# Patient Record
Sex: MALE | Race: BLACK OR AFRICAN AMERICAN | NOT HISPANIC OR LATINO | URBAN - METROPOLITAN AREA
[De-identification: names, ages, dates, MRNs, and addresses within clinical notes are randomized per-mention and may not be internally consistent; named-entity substitution may affect disease eponyms.]

---

## 2024-09-03 ENCOUNTER — EMERGENCY (EMERGENCY)
Facility: HOSPITAL | Age: 59
LOS: 1 days | Discharge: HOME | End: 2024-09-03
Attending: EMERGENCY MEDICINE | Admitting: EMERGENCY MEDICINE
Payer: COMMERCIAL

## 2024-09-03 VITALS
OXYGEN SATURATION: 96 % | HEART RATE: 75 BPM | DIASTOLIC BLOOD PRESSURE: 94 MMHG | RESPIRATION RATE: 18 BRPM | SYSTOLIC BLOOD PRESSURE: 180 MMHG | TEMPERATURE: 98 F

## 2024-09-03 VITALS
HEIGHT: 69 IN | SYSTOLIC BLOOD PRESSURE: 184 MMHG | WEIGHT: 164.91 LBS | TEMPERATURE: 98 F | DIASTOLIC BLOOD PRESSURE: 98 MMHG | HEART RATE: 85 BPM | RESPIRATION RATE: 20 BRPM | OXYGEN SATURATION: 98 %

## 2024-09-03 DIAGNOSIS — R00.2 PALPITATIONS: ICD-10-CM

## 2024-09-03 DIAGNOSIS — E78.5 HYPERLIPIDEMIA, UNSPECIFIED: ICD-10-CM

## 2024-09-03 DIAGNOSIS — I10 ESSENTIAL (PRIMARY) HYPERTENSION: ICD-10-CM

## 2024-09-03 DIAGNOSIS — I48.0 PAROXYSMAL ATRIAL FIBRILLATION: ICD-10-CM

## 2024-09-03 DIAGNOSIS — Z79.01 LONG TERM (CURRENT) USE OF ANTICOAGULANTS: ICD-10-CM

## 2024-09-03 LAB
ALBUMIN SERPL ELPH-MCNC: 3.7 G/DL — SIGNIFICANT CHANGE UP (ref 3.4–5)
ALP SERPL-CCNC: 117 U/L — SIGNIFICANT CHANGE UP (ref 40–120)
ALT FLD-CCNC: 62 U/L — HIGH (ref 12–42)
ANION GAP SERPL CALC-SCNC: 20 MMOL/L — HIGH (ref 9–16)
ANION GAP SERPL CALC-SCNC: 22 MMOL/L — HIGH (ref 9–16)
AST SERPL-CCNC: <5 U/L — LOW (ref 15–37)
BASOPHILS # BLD AUTO: 0.03 K/UL — SIGNIFICANT CHANGE UP (ref 0–0.2)
BASOPHILS NFR BLD AUTO: 0.6 % — SIGNIFICANT CHANGE UP (ref 0–2)
BILIRUB SERPL-MCNC: 0.4 MG/DL — SIGNIFICANT CHANGE UP (ref 0.2–1.2)
BUN SERPL-MCNC: 17 MG/DL — SIGNIFICANT CHANGE UP (ref 7–23)
BUN SERPL-MCNC: 18 MG/DL — SIGNIFICANT CHANGE UP (ref 7–23)
CALCIUM SERPL-MCNC: 8.6 MG/DL — SIGNIFICANT CHANGE UP (ref 8.5–10.5)
CALCIUM SERPL-MCNC: 8.7 MG/DL — SIGNIFICANT CHANGE UP (ref 8.5–10.5)
CHLORIDE SERPL-SCNC: 100 MMOL/L — SIGNIFICANT CHANGE UP (ref 96–108)
CHLORIDE SERPL-SCNC: 101 MMOL/L — SIGNIFICANT CHANGE UP (ref 96–108)
CO2 SERPL-SCNC: 16 MMOL/L — LOW (ref 22–31)
CO2 SERPL-SCNC: 19 MMOL/L — LOW (ref 22–31)
CREAT SERPL-MCNC: 1.14 MG/DL — SIGNIFICANT CHANGE UP (ref 0.5–1.3)
CREAT SERPL-MCNC: 1.32 MG/DL — HIGH (ref 0.5–1.3)
D DIMER BLD IA.RAPID-MCNC: <187 NG/ML DDU — SIGNIFICANT CHANGE UP
EGFR: 62 ML/MIN/1.73M2 — SIGNIFICANT CHANGE UP
EGFR: 74 ML/MIN/1.73M2 — SIGNIFICANT CHANGE UP
EOSINOPHIL # BLD AUTO: 0.01 K/UL — SIGNIFICANT CHANGE UP (ref 0–0.5)
EOSINOPHIL NFR BLD AUTO: 0.2 % — SIGNIFICANT CHANGE UP (ref 0–6)
GLUCOSE BLDC GLUCOMTR-MCNC: 86 MG/DL — SIGNIFICANT CHANGE UP (ref 70–99)
GLUCOSE SERPL-MCNC: 102 MG/DL — HIGH (ref 70–99)
GLUCOSE SERPL-MCNC: 84 MG/DL — SIGNIFICANT CHANGE UP (ref 70–99)
HCT VFR BLD CALC: 37.1 % — LOW (ref 39–50)
HGB BLD-MCNC: 12.4 G/DL — LOW (ref 13–17)
IMM GRANULOCYTES NFR BLD AUTO: 0.6 % — SIGNIFICANT CHANGE UP (ref 0–0.9)
LYMPHOCYTES # BLD AUTO: 1.54 K/UL — SIGNIFICANT CHANGE UP (ref 1–3.3)
LYMPHOCYTES # BLD AUTO: 32 % — SIGNIFICANT CHANGE UP (ref 13–44)
MAGNESIUM SERPL-MCNC: 1.3 MG/DL — LOW (ref 1.6–2.6)
MCHC RBC-ENTMCNC: 33.2 PG — SIGNIFICANT CHANGE UP (ref 27–34)
MCHC RBC-ENTMCNC: 33.4 GM/DL — SIGNIFICANT CHANGE UP (ref 32–36)
MCV RBC AUTO: 99.2 FL — SIGNIFICANT CHANGE UP (ref 80–100)
MONOCYTES # BLD AUTO: 0.41 K/UL — SIGNIFICANT CHANGE UP (ref 0–0.9)
MONOCYTES NFR BLD AUTO: 8.5 % — SIGNIFICANT CHANGE UP (ref 2–14)
NEUTROPHILS # BLD AUTO: 2.79 K/UL — SIGNIFICANT CHANGE UP (ref 1.8–7.4)
NEUTROPHILS NFR BLD AUTO: 58.1 % — SIGNIFICANT CHANGE UP (ref 43–77)
NRBC # BLD: 0 /100 WBCS — SIGNIFICANT CHANGE UP (ref 0–0)
PLATELET # BLD AUTO: 217 K/UL — SIGNIFICANT CHANGE UP (ref 150–400)
POTASSIUM SERPL-MCNC: 4.1 MMOL/L — SIGNIFICANT CHANGE UP (ref 3.5–5.3)
POTASSIUM SERPL-MCNC: 4.3 MMOL/L — SIGNIFICANT CHANGE UP (ref 3.5–5.3)
POTASSIUM SERPL-SCNC: 4.1 MMOL/L — SIGNIFICANT CHANGE UP (ref 3.5–5.3)
POTASSIUM SERPL-SCNC: 4.3 MMOL/L — SIGNIFICANT CHANGE UP (ref 3.5–5.3)
PROT SERPL-MCNC: 8.4 G/DL — HIGH (ref 6.4–8.2)
RBC # BLD: 3.74 M/UL — LOW (ref 4.2–5.8)
RBC # FLD: 12.4 % — SIGNIFICANT CHANGE UP (ref 10.3–14.5)
SODIUM SERPL-SCNC: 138 MMOL/L — SIGNIFICANT CHANGE UP (ref 132–145)
SODIUM SERPL-SCNC: 140 MMOL/L — SIGNIFICANT CHANGE UP (ref 132–145)
TROPONIN I, HIGH SENSITIVITY RESULT: 29.3 NG/L — SIGNIFICANT CHANGE UP
TROPONIN I, HIGH SENSITIVITY RESULT: 35.6 NG/L — SIGNIFICANT CHANGE UP
TSH SERPL-MCNC: 0.57 UIU/ML — SIGNIFICANT CHANGE UP (ref 0.36–3.74)
WBC # BLD: 4.81 K/UL — SIGNIFICANT CHANGE UP (ref 3.8–10.5)
WBC # FLD AUTO: 4.81 K/UL — SIGNIFICANT CHANGE UP (ref 3.8–10.5)

## 2024-09-03 PROCEDURE — 70498 CT ANGIOGRAPHY NECK: CPT | Mod: 26,MC

## 2024-09-03 PROCEDURE — 71045 X-RAY EXAM CHEST 1 VIEW: CPT | Mod: 26

## 2024-09-03 PROCEDURE — 0042T: CPT | Mod: MC

## 2024-09-03 PROCEDURE — 70496 CT ANGIOGRAPHY HEAD: CPT | Mod: 26,MC

## 2024-09-03 PROCEDURE — 99285 EMERGENCY DEPT VISIT HI MDM: CPT

## 2024-09-03 RX ORDER — ONDANSETRON 2 MG/ML
4 INJECTION, SOLUTION INTRAMUSCULAR; INTRAVENOUS ONCE
Refills: 0 | Status: COMPLETED | OUTPATIENT
Start: 2024-09-03 | End: 2024-09-03

## 2024-09-03 RX ORDER — LISINOPRIL 10 MG/1
20 TABLET ORAL ONCE
Refills: 0 | Status: DISCONTINUED | OUTPATIENT
Start: 2024-09-03 | End: 2024-09-03

## 2024-09-03 RX ORDER — AMLODIPINE BESYLATE 10 MG/1
5 TABLET ORAL ONCE
Refills: 0 | Status: DISCONTINUED | OUTPATIENT
Start: 2024-09-03 | End: 2024-09-03

## 2024-09-03 RX ORDER — APIXABAN 5 MG/1
1 TABLET, FILM COATED ORAL
Qty: 60 | Refills: 0
Start: 2024-09-03 | End: 2024-10-02

## 2024-09-03 RX ADMIN — ONDANSETRON 4 MILLIGRAM(S): 2 INJECTION, SOLUTION INTRAMUSCULAR; INTRAVENOUS at 14:28

## 2024-09-03 RX ADMIN — Medication 100 GRAM(S): at 10:19

## 2024-09-03 NOTE — ED ADULT NURSE NOTE - CHIEF COMPLAINT QUOTE
Pt brought in by EMS with complaint of panic attack stating he palpitations, anxiety and numbness and tingling to his hands. Reports last time this happened, he went to [place] and they discovered he had afib. Denies chest pain.

## 2024-09-03 NOTE — ED PROVIDER NOTE - OBJECTIVE STATEMENT
59 yo male with paroxysmal a fib on xeralto, htn on amlodipine/benazepril, metoprolol, hld, presents c/o feeling like his heart was racing that started while at work this 8am. denies cp/sob/syncope/fever or chills/abd pain/neck pain/back pain. currently feeling tingling to bilateral 3/4/5th fingertips. arrives hypertensive. states he has been compliant with his medications. 57 yo male with paroxysmal a fib on xeralto, htn on amlodipine/benazepril, metoprolol, hld, presents c/o feeling like his heart was racing that started while at work this 8am. also reports he has difficulty with word finding since this morning, currently asking "why am I having such difficult time finding words?". denies cp/sob/syncope/fever or chills/abd pain/neck pain/back pain. currently feeling tingling to bilateral 3/4/5th fingertips. arrives hypertensive. states he has been compliant with his medications. reports one episode of similar symptoms previously where he went to [facility] and he was told he was in afib. has cardiologist from the [facility] system that he follows with

## 2024-09-03 NOTE — ED PROVIDER NOTE - PROGRESS NOTE DETAILS
CT brain neg, CTA brain/neck no acute findings. mag 1.3, supplemented, labs unremarkable. patient feeling better, symptoms resolved. NIHSS 0. EKG NSR at 83, twi inferiorlateral, J point elevation V2, V3, LVH. Reviewed previous EKG on patient's phone portal, similar to current EKG although poor quality/lots of artifact. discussed with Dr. Macedo attending telestroke neurologist, he advised switching patient's home meds to eliquis (from [facility]), follow up outpatient. repeat 2nd ekg NSR at 81, twi globally except I, avL, V3, no ST elevation or depression, 3rd EKG similar to 1st EKG, discussed with cardiologist attending on call Dr. Church, reviewed EKGs, no further intervention at this time, patient to follow up with his cardiologist outpatient. patient looks well, he is feeling better, tolerating po. he would like to be discharged, dc home with family, they agree with plan.

## 2024-09-03 NOTE — ED PROVIDER NOTE - PATIENT PORTAL LINK FT
You can access the FollowMyHealth Patient Portal offered by [facility] by registering at the following website: [email or web address]. By joining [employer]’s FollowMyHealth portal, you will also be able to view your health information using other applications (apps) compatible with our system.

## 2024-09-03 NOTE — ED PROVIDER NOTE - ATTENDING APP SHARED VISIT CONTRIBUTION OF CARE
59yo M hx of paroxysmal AFib on Xarelto, HTN, HLD, presents with sensation of heart racing and difficulty with word finding as well as tingling in b/l fingertips.  On exam, afebrile, hypertensive, no localizing neuro deficits.  EKG NSR w inferolateral TWI.  Stroke code called on arrival to ED.  Imaging unremarkable.  Trop negative x2, repeat EKG unchanged.  Pt feels well in ED, wants to go home.  Case discussed w Dr. Macedo from stroke neuro and Dr. peters from cardiology.  OK to Dc home with plan for close outpatient follow up.  Stable for dc.

## 2024-09-03 NOTE — ED PROVIDER NOTE - NSFOLLOWUPINSTRUCTIONS_ED_ALL_ED_FT
Please take eliquis instead of xeralto  Please follow up with neurologist Dr. Macedo, please call office and make an appointment.   Continue taking the rest of your medications.  Please follow up with your cardiologist  Your magnesium was low, it was supplemented in the Emergency Room    Please return to the Emergency Room for worsening or new symptoms such as slurred speech, weakness, chest pain, shortness of breath, or any concerns.

## 2024-09-03 NOTE — ED ADULT NURSE NOTE - OBJECTIVE STATEMENT
Patient is a 57 y/o M c/o panic attack, patient reports panic attack and heart palpitations that began at 630 am. patient reports recent diagnosis of a fib in December 2023. patient is on xarelto, and lisinopril. patient has hx of htn. patient has difficulty catching words. patient reports mild headache. Frances Foley made aware.

## 2024-09-03 NOTE — ED ADULT TRIAGE NOTE - CHIEF COMPLAINT QUOTE
Psych/Behavioral Pt brought in by EMS with complaint of panic attack stating he palpitations, anxiety and numbness and tingling to his hands. Reports last time this happened, he went to [place] and they discovered he had afib. Denies chest pain.

## 2024-09-03 NOTE — ED PROVIDER NOTE - CLINICAL SUMMARY MEDICAL DECISION MAKING FREE TEXT BOX
59 yo male with paroxysmal a fib on xeralto, htn on amlodipine/benazepril, metoprolol, hld, presents c/o feeling like his heart was racing that started while at work this 8am. also reports he has difficulty with word finding since this morning, currently asking "why am I having such difficult time finding words?". denies cp/sob/syncope/fever or chills/abd pain/neck pain/back pain. currently feeling tingling to bilateral 3/4/5th fingertips. arrives hypertensive. states he has been compliant with his medications. reports one episode of similar symptoms previously where he went to [facility] and he was told he was in afib. has cardiologist from the [facility] system that he follows with    patient arrives hypertensive, no focal deficits on exam, NIHSS 0, Stroke code called, labs/imaging/ekg ordered.

## 2024-09-03 NOTE — ED ADULT NURSE NOTE - NSFALLUNIVINTERV_ED_ALL_ED
Bed/Stretcher in lowest position, wheels locked, appropriate side rails in place/Call bell, personal items and telephone in reach/Instruct patient to call for assistance before getting out of bed/chair/stretcher/Non-slip footwear applied when patient is off stretcher/[place] to call system/Physically safe environment - no spills, clutter or unnecessary equipment/Purposeful proactive rounding/Room/bathroom lighting operational, light cord in reach

## 2024-09-03 NOTE — ED PROVIDER NOTE - CARE PROVIDER_API CALL
Gwen Macedo  Neurology  [address] [address]  Phone: [phone]  Fax: [phone]  Follow Up Time:

## 2024-09-09 ENCOUNTER — NON-APPOINTMENT (OUTPATIENT)
Age: 59
End: 2024-09-09

## 2024-09-09 PROBLEM — Z00.00 ENCOUNTER FOR PREVENTIVE HEALTH EXAMINATION: Status: ACTIVE | Noted: 2024-09-09

## 2025-03-03 ENCOUNTER — INPATIENT (INPATIENT)
Facility: HOSPITAL | Age: 60
LOS: 0 days | Discharge: HOME | DRG: 897 | End: 2025-03-04
Attending: PSYCHIATRY & NEUROLOGY | Admitting: PSYCHIATRY & NEUROLOGY
Payer: COMMERCIAL

## 2025-03-03 VITALS
SYSTOLIC BLOOD PRESSURE: 158 MMHG | HEART RATE: 87 BPM | DIASTOLIC BLOOD PRESSURE: 83 MMHG | TEMPERATURE: 99 F | WEIGHT: 160.06 LBS | RESPIRATION RATE: 16 BRPM | OXYGEN SATURATION: 100 %

## 2025-03-03 DIAGNOSIS — E87.29 OTHER ACIDOSIS: ICD-10-CM

## 2025-03-03 DIAGNOSIS — C61 MALIGNANT NEOPLASM OF PROSTATE: ICD-10-CM

## 2025-03-03 DIAGNOSIS — I48.20 CHRONIC ATRIAL FIBRILLATION, UNSPECIFIED: ICD-10-CM

## 2025-03-03 DIAGNOSIS — I10 ESSENTIAL (PRIMARY) HYPERTENSION: ICD-10-CM

## 2025-03-03 DIAGNOSIS — F10.20 ALCOHOL DEPENDENCE, UNCOMPLICATED: ICD-10-CM

## 2025-03-03 DIAGNOSIS — D72.819 DECREASED WHITE BLOOD CELL COUNT, UNSPECIFIED: ICD-10-CM

## 2025-03-03 LAB
ALBUMIN SERPL ELPH-MCNC: 4 G/DL — SIGNIFICANT CHANGE UP (ref 3.4–5)
ALP SERPL-CCNC: 128 U/L — HIGH (ref 40–120)
ALT FLD-CCNC: 20 U/L — SIGNIFICANT CHANGE UP (ref 12–42)
ANION GAP SERPL CALC-SCNC: 17 MMOL/L — HIGH (ref 9–16)
APPEARANCE UR: CLEAR — SIGNIFICANT CHANGE UP
APTT BLD: 34 SEC — SIGNIFICANT CHANGE UP (ref 24.5–35.6)
AST SERPL-CCNC: 22 U/L — SIGNIFICANT CHANGE UP (ref 15–37)
BASOPHILS # BLD AUTO: 0.03 K/UL — SIGNIFICANT CHANGE UP (ref 0–0.2)
BASOPHILS NFR BLD AUTO: 1 % — SIGNIFICANT CHANGE UP (ref 0–2)
BILIRUB SERPL-MCNC: 0.5 MG/DL — SIGNIFICANT CHANGE UP (ref 0.2–1.2)
BILIRUB UR-MCNC: NEGATIVE — SIGNIFICANT CHANGE UP
BUN SERPL-MCNC: 22 MG/DL — SIGNIFICANT CHANGE UP (ref 7–23)
CALCIUM SERPL-MCNC: 9.8 MG/DL — SIGNIFICANT CHANGE UP (ref 8.5–10.5)
CHLORIDE SERPL-SCNC: 104 MMOL/L — SIGNIFICANT CHANGE UP (ref 96–108)
CO2 SERPL-SCNC: 21 MMOL/L — LOW (ref 22–31)
COLOR SPEC: YELLOW — SIGNIFICANT CHANGE UP
CREAT SERPL-MCNC: 1.06 MG/DL — SIGNIFICANT CHANGE UP (ref 0.5–1.3)
DIFF PNL FLD: ABNORMAL
EGFR: 80 ML/MIN/1.73M2 — SIGNIFICANT CHANGE UP
EGFR: 80 ML/MIN/1.73M2 — SIGNIFICANT CHANGE UP
EOSINOPHIL # BLD AUTO: 0 K/UL — SIGNIFICANT CHANGE UP (ref 0–0.5)
EOSINOPHIL NFR BLD AUTO: 0 % — SIGNIFICANT CHANGE UP (ref 0–6)
ETHANOL SERPL-MCNC: 133 MG/DL — HIGH
GLUCOSE SERPL-MCNC: 109 MG/DL — HIGH (ref 70–99)
GLUCOSE UR QL: NEGATIVE MG/DL — SIGNIFICANT CHANGE UP
HCT VFR BLD CALC: 34.2 % — LOW (ref 39–50)
HGB BLD-MCNC: 11.8 G/DL — LOW (ref 13–17)
IMM GRANULOCYTES # BLD AUTO: 0.03 K/UL — SIGNIFICANT CHANGE UP (ref 0–0.07)
IMM GRANULOCYTES NFR BLD AUTO: 1 % — HIGH (ref 0–0.9)
INR BLD: 0.94 — SIGNIFICANT CHANGE UP (ref 0.85–1.16)
KETONES UR-MCNC: ABNORMAL MG/DL
LEUKOCYTE ESTERASE UR-ACNC: NEGATIVE — SIGNIFICANT CHANGE UP
LYMPHOCYTES # BLD AUTO: 0.59 K/UL — LOW (ref 1–3.3)
LYMPHOCYTES NFR BLD AUTO: 18.9 % — SIGNIFICANT CHANGE UP (ref 13–44)
MAGNESIUM SERPL-MCNC: 1.9 MG/DL — SIGNIFICANT CHANGE UP (ref 1.6–2.6)
MCHC RBC-ENTMCNC: 32.8 PG — SIGNIFICANT CHANGE UP (ref 27–34)
MCHC RBC-ENTMCNC: 34.5 G/DL — SIGNIFICANT CHANGE UP (ref 32–36)
MCV RBC AUTO: 95 FL — SIGNIFICANT CHANGE UP (ref 80–100)
MONOCYTES # BLD AUTO: 0.24 K/UL — SIGNIFICANT CHANGE UP (ref 0–0.9)
MONOCYTES NFR BLD AUTO: 7.7 % — SIGNIFICANT CHANGE UP (ref 2–14)
NEUTROPHILS # BLD AUTO: 2.23 K/UL — SIGNIFICANT CHANGE UP (ref 1.8–7.4)
NEUTROPHILS NFR BLD AUTO: 71.4 % — SIGNIFICANT CHANGE UP (ref 43–77)
NITRITE UR-MCNC: NEGATIVE — SIGNIFICANT CHANGE UP
NRBC # BLD AUTO: 0 K/UL — SIGNIFICANT CHANGE UP (ref 0–0)
NRBC # FLD: 0 K/UL — SIGNIFICANT CHANGE UP (ref 0–0)
NRBC BLD AUTO-RTO: 0 /100 WBCS — SIGNIFICANT CHANGE UP (ref 0–0)
PH UR: 5.5 — SIGNIFICANT CHANGE UP (ref 5–8)
PHOSPHATE SERPL-MCNC: 3.3 MG/DL — SIGNIFICANT CHANGE UP (ref 2.5–4.5)
PLATELET # BLD AUTO: 216 K/UL — SIGNIFICANT CHANGE UP (ref 150–400)
PMV BLD: 8.7 FL — SIGNIFICANT CHANGE UP (ref 7–13)
POTASSIUM SERPL-MCNC: 4.5 MMOL/L — SIGNIFICANT CHANGE UP (ref 3.5–5.3)
POTASSIUM SERPL-SCNC: 4.5 MMOL/L — SIGNIFICANT CHANGE UP (ref 3.5–5.3)
PROT SERPL-MCNC: 8.8 G/DL — HIGH (ref 6.4–8.2)
PROT UR-MCNC: 30 MG/DL
PROTHROM AB SERPL-ACNC: 10.9 SEC — SIGNIFICANT CHANGE UP (ref 9.9–13.4)
RBC # BLD: 3.6 M/UL — LOW (ref 4.2–5.8)
RBC # FLD: 11.9 % — SIGNIFICANT CHANGE UP (ref 10.3–14.5)
SODIUM SERPL-SCNC: 142 MMOL/L — SIGNIFICANT CHANGE UP (ref 132–145)
SP GR SPEC: 1.01 — SIGNIFICANT CHANGE UP (ref 1–1.03)
TROPONIN I, HIGH SENSITIVITY RESULT: 17 NG/L — SIGNIFICANT CHANGE UP
UROBILINOGEN FLD QL: 0.2 MG/DL — SIGNIFICANT CHANGE UP (ref 0.2–1)
WBC # BLD: 3.12 K/UL — LOW (ref 3.8–10.5)
WBC # FLD AUTO: 3.12 K/UL — LOW (ref 3.8–10.5)

## 2025-03-03 PROCEDURE — 70496 CT ANGIOGRAPHY HEAD: CPT | Mod: 26

## 2025-03-03 PROCEDURE — 99223 1ST HOSP IP/OBS HIGH 75: CPT

## 2025-03-03 PROCEDURE — 99291 CRITICAL CARE FIRST HOUR: CPT

## 2025-03-03 PROCEDURE — 70498 CT ANGIOGRAPHY NECK: CPT | Mod: 26

## 2025-03-03 PROCEDURE — 70551 MRI BRAIN STEM W/O DYE: CPT | Mod: 26

## 2025-03-03 PROCEDURE — 70450 CT HEAD/BRAIN W/O DYE: CPT | Mod: 26,59

## 2025-03-03 PROCEDURE — 0042T: CPT

## 2025-03-03 RX ORDER — ABIRATERONE ACETATE 500 MG/1
4 TABLET, FILM COATED ORAL
Refills: 0 | DISCHARGE

## 2025-03-03 RX ORDER — AMLODIPINE BESYLATE 10 MG/1
1 TABLET ORAL
Refills: 0 | DISCHARGE

## 2025-03-03 RX ORDER — PREDNISONE 20 MG/1
1 TABLET ORAL
Refills: 0 | DISCHARGE

## 2025-03-03 RX ORDER — RIVAROXABAN 10 MG/1
1 TABLET, FILM COATED ORAL
Refills: 0 | DISCHARGE

## 2025-03-03 RX ORDER — TAMSULOSIN HYDROCHLORIDE 0.4 MG/1
0.4 CAPSULE ORAL DAILY
Refills: 0 | Status: DISCONTINUED | OUTPATIENT
Start: 2025-03-04 | End: 2025-03-04

## 2025-03-03 RX ORDER — RIVAROXABAN 10 MG/1
20 TABLET, FILM COATED ORAL
Refills: 0 | Status: DISCONTINUED | OUTPATIENT
Start: 2025-03-03 | End: 2025-03-04

## 2025-03-03 RX ORDER — METOPROLOL SUCCINATE 50 MG/1
50 TABLET, EXTENDED RELEASE ORAL DAILY
Refills: 0 | Status: DISCONTINUED | OUTPATIENT
Start: 2025-03-03 | End: 2025-03-03

## 2025-03-03 RX ORDER — METOPROLOL SUCCINATE 50 MG/1
1 TABLET, EXTENDED RELEASE ORAL
Refills: 0 | DISCHARGE

## 2025-03-03 RX ORDER — TAMSULOSIN HYDROCHLORIDE 0.4 MG/1
1 CAPSULE ORAL
Refills: 0 | DISCHARGE

## 2025-03-03 RX ORDER — METOPROLOL SUCCINATE 50 MG/1
50 TABLET, EXTENDED RELEASE ORAL DAILY
Refills: 0 | Status: DISCONTINUED | OUTPATIENT
Start: 2025-03-03 | End: 2025-03-04

## 2025-03-03 RX ADMIN — METOPROLOL SUCCINATE 50 MILLIGRAM(S): 50 TABLET, EXTENDED RELEASE ORAL at 16:34

## 2025-03-03 RX ADMIN — RIVAROXABAN 20 MILLIGRAM(S): 10 TABLET, FILM COATED ORAL at 16:34

## 2025-03-03 RX ADMIN — Medication 100 MILLIGRAM(S): at 12:16

## 2025-03-03 RX ADMIN — Medication 75 MILLILITER(S): at 12:16

## 2025-03-03 NOTE — ED PROVIDER NOTE - ATTENDING CONTRIBUTION TO CARE
Patient brought in by ambulance for difficulty speaking. Had unsteady gait on scene per EMS. Patient reports right face, arm, and leg numbness. Patient reported his last known well was 6:30 AM, but coworker who came with patient said that patient is a first wanted to work every morning, and another coworker who arrived at 620 saw that the patient already looked unwell at that time.  I spoke to patient's wife on the phone who gave last known well at 4 AM.  Patient took his Eliquis this morning.  Triage nurse wrote that patient started on Eliquis 1 week ago.  Wife reports it was 1 year ago.  Patient finished radiation for prostate cancer in January and is now taking abiraterone.  No headache, chest pain, shortness of breath, abdominal pain, focal weakness.    Gen: NAD. HEENT: NCAT, mmm   Chest: RRR, nl S1 and S2, no m/r/g. Resp: CTAB, no w/r/r  Abd: nl BS, soft, nt/nd. Ext: Warm, dry  Neuro: CN II-XII intact, normal and equal strength and reflexes bilaterally, hypesthesia to right face, arm, and leg. stuttering speech, difficulty with word finding.  Psych: AAOx3

## 2025-03-03 NOTE — ED PROVIDER NOTE - NIH STROKE SCALE: 10. DYSARTHRIA, QM
[NL] : warm [FreeTextEntry1] : WELL-HYDRATED [FreeTextEntry2] : AFOF [FreeTextEntry9] : SMALL PROTRUSION OF  UMBILICUS HERNIA, HEALING SURGICAL SCARS (1 ON EACH ARIEL-ABDOMEN, ~2MM) (1) Mild-to-moderate dysarthria; patient slurs at least some words and, at worst, can be understood with some difficulty

## 2025-03-03 NOTE — H&P ADULT - NSHPPHYSICALEXAM_GEN_ALL_CORE
Physical exam:  General: No acute distress, awake and alert  Cardiovascular: Regular rate and rhythm  Pulmonary: No use of accessory muscles  GI: Abdomen soft, non-tender, non-distended    Neurologic:  -Mental status: Awake, alert, oriented to person, place, and time. Speech is fluent with intact naming, repetition, and comprehension, no dysarthria. Recent and remote memory intact. Follows commands. Attention/concentration intact. Fund of knowledge appropriate.  -Cranial nerves:   II: Visual fields are full to confrontation.  III, IV, VI: Extraocular movements are intact without nystagmus. Pupils equally round and reactive to light  V:  Facial sensation V1-V3 equal and intact   VII: Face is symmetric with normal eye closure and smile  VIII: Hearing is bilaterally intact to finger rub  IX, X: Uvula is midline and soft palate rises symmetrically  XI: Head turning and shoulder shrug are intact.  XII: Tongue protrudes midline  Motor: Normal bulk and tone. No pronator drift. Strength bilateral upper extremity 5/5, bilateral lower extremities 5/5.  Rapid alternating movements intact and symmetric  Sensation: Intact to light touch bilaterally. No neglect or extinction on double simultaneous testing.  Coordination: No dysmetria on finger-to-nose and heel-to-shin bilaterally  Reflexes: Downgoing toes bilaterally   Gait: Narrow gait and steady    NIHSS: 0  ASPECT Score: *** ICH Score: *** (GCS) Physical exam:  General: No acute distress, awake and alert  Cardiovascular: Regular rate and rhythm  Pulmonary: No use of accessory muscles  GI: Abdomen soft, non-tender, non-distended    Neurologic:  -Mental status: Awake, alert, oriented to person, place, and time. Speech is fluent with intact naming, repetition, and comprehension, no dysarthria. Recent and remote memory intact. Follows commands. Attention/concentration intact. Fund of knowledge appropriate. Patient is still intoxicated during exam but attentive.   -Cranial nerves:   II: Visual fields are full to confrontation, some blurry vision on right side (cataracts).   III, IV, VI: Extraocular movements are intact without nystagmus. Pupils equally round and reactive to light  V:  Facial sensation V1-V3 equal and intact   VII: Face is symmetric with normal eye closure and smile  VIII: Hearing is bilaterally intact to finger rub  XII: Tongue protrudes midline  Motor: Normal bulk and tone. No pronator drift. Strength bilateral upper extremity 4+/5, bilateral lower extremities 4+/5.  Sensation: Intact to light touch bilaterally. No neglect or extinction on double simultaneous testing.  Coordination: No dysmetria on finger-to-nose and heel-to-shin bilaterally  Gait: Patient can stand, however unable to walk due to intoxication, he states that the "room is swaying still"    NIHSS: 0  ASPECT Score: *** ICH Score: *** (GCS)

## 2025-03-03 NOTE — CONSULT NOTE ADULT - TIME BILLING
preparing to see Pt.; interviewing Pt.; examining Pt.; reviewing labs and images; documentation in [place]; d/c planning on IDRs; d/w Neuro ACP on rounds

## 2025-03-03 NOTE — PATIENT PROFILE ADULT - FALL HARM RISK - HARM RISK INTERVENTIONS
Assistance with ambulation/Assistance OOB with selected safe patient handling equipment/Communicate Risk of Fall with Harm to all staff/Discuss with provider need for PT consult/Monitor gait and stability/Reinforce activity limits and safety measures with patient and family/Tailored Fall Risk Interventions/Visual Cue: Yellow wristband and red socks/Bed in lowest position, wheels locked, appropriate side rails in place/Call bell, personal items and telephone in reach/Instruct patient to call for assistance before getting out of bed or chair/Non-slip footwear when patient is out of bed/[place] to call system/Physically safe environment - no spills, clutter or unnecessary equipment/Purposeful Proactive Rounding/Room/bathroom lighting operational, light cord in reach

## 2025-03-03 NOTE — CONSULT NOTE ADULT - PROBLEM SELECTOR RECOMMENDATION 2
daily drinker, (+) BAL  -- serial CIWAs, monitor for withdrawal  -- IV thiamine, folate, MVI  --  THEODORE gutierrez

## 2025-03-03 NOTE — H&P ADULT - ASSESSMENT
59-year-old male with PMHx  of atrial fibrillation compliant on Xarelto, hypertension, prostate cancer treated most recently with radiation in January presented to [facility] ER due to acute onset speech deficits, gait instability, right-sided sensory loss beginning sometime between 4 AM and 6:15 AM  3/3.  Patient was transferred to [facility] for stroke workup. Upon arrival , alcohol blood level was 133, pt was intoxicated. Per the wife, she saw  him leave for work without symptoms at 4 AM.  He was alone at his job until 615 when a coworker found him to be abnormal, stuttering his words and unable to lift his right side/ Wife states that this type of event has happened  twice before on two separate ER vists, after the patient takes his morning meds ( right side weakness, slurring of words). Wife also states that pt is a hevay drinker, and drinks mostly vodka every single day.  On assessment, pt is stutteirng words and able to follow some commands while intoxicated. Strengths upper and lower extremities is 5/5, unable to perform remainder of exam due to intoxication. CTH negative. Fluids and Thiamine ordered, and pt was put on CIWA precautions. Plan to reassess once pt has returned to baseline, admitted to stroke tele for further work up.     Neuro  #CVA workup  -  daily  - q4hr stroke neuro checks and vitals  - obtain MRI Brain without contrast  - Stroke Code HCT Results:   - Stroke Code CTA Results:  - Stroke education    Cards  #HTN  - Goal -180  - hold home blood pressure medication for now  - obtain TTE with bubble  - Stroke Code EKG Results:    #HLD  - high dose statin as above in CVA  - LDL results: pending    Pulm  - call provider if SPO2 < 94%    GI  #Nutrition/Fluids/Electrolytes   - replete K<4 and Mg <2  - Diet:  - IVF:     Renal  -     Infectious Disease  - Stroke Code CXR results:     Endocrine  #DM  - A1C results: pending  - ISS    - TSH results: pending    DVT Prophylaxis  - lovenox sq for DVT prophylaxis   - SCDs for DVT prophylaxis     Pt discussed during rounds with  *****    IDR Goals: Goals reviewed at interdisciplinary rounds with case management, social work, physical therapy, occupational therapy, and speech language pathology.   Please see specific therapy  notes for in depth goals.  Dispo: **********(Acute rehab - can tolerate 3 hours of therapy)     Discussed daily hospital plans and goals with patient and family at bedside. (Called and updated family.) 59-year-old male with PMHx  of atrial fibrillation compliant on Xarelto, hypertension, prostate cancer treated most recently with radiation in January presented to [facility] ER due to acute onset speech deficits, gait instability, right-sided sensory loss beginning sometime between 4 AM and 6:15 AM  3/3.  Patient was transferred to [facility] for stroke workup. Upon arrival , alcohol blood level was 133, pt was intoxicated. Per the wife, she saw  him leave for work without symptoms at 4 AM.  He was alone at his job until 615 when a coworker found him to be abnormal, stuttering his words and unable to lift his right side/ Wife states that this type of event has happened  twice before on two separate ER vists, after the patient takes his morning meds ( right side weakness, slurring of words). Wife also states that pt is a hevay drinker, and drinks mostly vodka every single day.  On assessment, pt is stutteirng words and able to follow some commands while intoxicated. Strengths upper and lower extremities is 5/5, unable to perform remainder of exam due to intoxication. CTH negative. Fluids and Thiamine ordered, and pt was put on CIWA precautions. Plan to reassess once pt has returned to baseline, admitted to stroke tele for further work up.     Neuro  #CVA workup   -  daily  - q4hr stroke neuro checks and vitals  - obtain MRI Brain without contrast  - Stroke Code HCT Results:   - Stroke Code CTA Results:  - Stroke education    Cards  #HTN #afib   - Goal -180  - hold home blood pressure medication for now  - obtain TTE with bubble  - Stroke Code EKG Results:    #HLD  - high dose statin as above in CVA  - LDL results: pending    Pulm  - call provider if SPO2 < 94%    GI  #Nutrition/Fluids/Electrolytes   - replete K<4 and Mg <2  - Diet:  - IVF:     Renal  -     Infectious Disease  - Stroke Code CXR results:     Endocrine  #DM  - A1C results: pending  - ISS    - TSH results: pending    DVT Prophylaxis  - lovenox sq for DVT prophylaxis   - SCDs for DVT prophylaxis     Pt discussed during rounds with  *****    IDR Goals: Goals reviewed at interdisciplinary rounds with case management, social work, physical therapy, occupational therapy, and speech language pathology.   Please see specific therapy  notes for in depth goals.  Dispo: **********(Acute rehab - can tolerate 3 hours of therapy)     Discussed daily hospital plans and goals with patient and family at bedside. (Called and updated family.) 59-year-old male with PMHx  of atrial fibrillation compliant on Xarelto, hypertension, prostate cancer treated most recently with radiation in January presented to [facility] ER due to acute onset speech deficits, gait instability, right-sided sensory loss beginning sometime between 4 AM and 6:15 AM  3/3.  Patient was transferred to [facility] for stroke workup. Upon arrival , alcohol blood level was 133, pt was intoxicated. Per the wife, she saw  him leave for work without symptoms at 4 AM.  He was alone at his job until 615 when a coworker found him to be abnormal, stuttering his words and unable to lift his right side/ Wife states that this type of event has happened  twice before on two separate ER vists, after the patient takes his morning meds ( right side weakness, slurring of words). Wife also states that pt is a hevay drinker, and drinks mostly vodka every single day.  On assessment, pt is stutteirng words and able to follow some commands while intoxicated. Strengths upper and lower extremities is 5/5, unable to perform remainder of exam due to intoxication. CTH negative. Fluids and Thiamine ordered, and pt was put on CIWA precautions. Plan to reassess once pt has returned to baseline, admitted to stroke tele for further work up.     Neuro  #CVA workup   -  daily  - q4hr stroke neuro checks and vitals  - obtain MRI Brain without contrast  - Stroke Code HCT Results:   - Stroke Code CTA Results:  - Stroke education    Cards  #HTN #afib   - Goal -180  - Continue Metop succinate 50mg daily   - hold home blood pressure medication for now    #HLD  - LDL results: pending    Pulm  - call provider if SPO2 < 94%    GI  #Nutrition/Fluids/Electrolytes   - replete K<4 and Mg <2  - Diet::   - IVF: 75ml/hr NS      Infectious Disease  - Stroke Code CXR results:     Endocrine  #DM  - A1C results: pending    - TSH results: pending    Pt discussed during rounds with Dr. Remington POLLOCK Goals: Goals reviewed at interdisciplinary rounds with case management, social work, physical therapy, occupational therapy, and speech language pathology.   Please see specific therapy  notes for in depth goals.  Dispo: pending PT/OT     Discussed daily hospital plans and goals with patient and family at bedside. (Called and updated family.) 59-year-old male with PMHx  of atrial fibrillation compliant on Xarelto, hypertension, prostate cancer treated most recently with radiation in January presented to [facility] ER due to acute onset speech deficits, gait instability, right-sided sensory loss beginning sometime between 4 AM and 6:15 AM  3/3.  Patient was transferred to [facility] for stroke workup. Upon arrival , alcohol blood level was 133, pt was intoxicated. Per the wife, she saw  him leave for work without symptoms at 4 AM.  He was alone at his job until 615 when a coworker found him to be abnormal, stuttering his words and unable to lift his right side/ Wife states that this type of event has happened  twice before on two separate ER vists, after the patient takes his morning meds ( right side weakness, slurring of words). Wife also states that pt is a hevay drinker, and drinks mostly vodka every single day.  On assessment, pt is stutteirng words and able to follow some commands while intoxicated. Strengths upper and lower extremities is 5/5, unable to perform remainder of exam due to intoxication. CTH negative. Fluids and Thiamine ordered, and pt was put on CIWA precautions. Plan to reassess once pt has returned to baseline, admitted to stroke tele for further work up.     Neuro  #CVA workup   -  Continue Xarelto 20mg daily   - q4hr stroke neuro checks and vitals  - obtain MRI Brain without contrast- short stroke   - Stroke Code HCT Results: No acute intracranialhemorrhage, mass effect, or midline shift.  - Stroke Code CTA Results:  CTA Head    No evidence of significant stenosis or occlusion.  CTA Neck  No large vessel occlusion, significant stenosis or vascular abnormality   identified.  - Stroke education    Cards  #HTN #afib   - Goal -180  - Continue Metop succinate 50mg daily   - hold home  for now    #HLD  - LDL results: pending    Pulm  - call provider if SPO2 < 94%    GI  #Nutrition/Fluids/Electrolytes   - replete K<4 and Mg <2  - Diet::   - IVF: 75ml/hr NS      Infectious Disease  - Stroke Code CXR results:     Endocrine  #DM  - A1C results: pending    - TSH results: pending    Pt discussed during rounds with Dr. Remington POLLOCK Goals: Goals reviewed at interdisciplinary rounds with case management, social work, physical therapy, occupational therapy, and speech language pathology.   Please see specific therapy  notes for in depth goals.  Dispo: pending PT/OT     Discussed daily hospital plans and goals with patient and family at bedside. (Called and updated family.)  59-year-old male with PMHx  of atrial fibrillation compliant on Xarelto, hypertension, prostate cancer treated most recently with radiation in January presented to [facility] ER due to acute onset speech deficits, gait instability, right-sided sensory loss beginning sometime between 4 AM and 6:15 AM  3/3.  Patient was transferred to [facility] for stroke workup. Upon arrival , alcohol blood level was 133, Pt was intoxicated. Per the wife, she saw  him leave for work without symptoms at 4 AM.  He was alone at his job until 615 when a coworker found him to be abnormal, stuttering his words and unable to lift his right side. Wife states that this type of event has happened  twice before on two separate ER visits after the patient takes his morning meds ( right side weakness, slurring of words). Wife also states that pt is a heavy drinker, and drinks mostly vodka every single day.  On assessment, pt is stutteirng words and able to follow some commands while intoxicated. Strengths upper and lower extremities is 5/5, unable to perform remainder of intial exam due to intoxication. Subsequent exam was nonfocal, with some gait instability because "the room was still swaying". NIHSS 0. CTH negative. MRI short stroke ordered. Admitted to stroke tele for further work up.     Neuro  #CVA workup   - Continue Xarelto 20mg daily - CTH neg for bleed  - q4hr stroke neuro checks and vitals  - obtain MRI Brain without contrast- short stroke   - Stroke Code HCT Results: No acute intracranial hemorrhage, mass effect, or midline shift.  - Stroke Code CTA Results:  CTA Head    No evidence of significant stenosis or occlusion.  CTA Neck  No large vessel occlusion, significant stenosis or vascular abnormality   identified.  - Stroke education  #Alcohol use disorder  - CIWA precuations   - Fluids 75ml/hr NS     Cards  #HTN #afib   - Goal -180  - Continue Metop succinate 50mg daily   - hold home Amlodipine for now  - LDL results: pending    Pulm  - call provider if SPO2 < 94%    GI  #Nutrition/Fluids/Electrolytes   - replete K<4 and Mg <2  - Diet:: Regular   - IVF: 75ml/hr NS    Genitourinary   #Prostate cancer - last radiation appt was 01/2025   - Continue flomax 0.4mg daily   - continue  Predisone 5mg daily   - Need onco to approve Abiraterone Acetate 250mg- pharmacy cannot approve on their own     Infectious Disease  - Trend WBC, fevers     Endocrine  - A1C results: pending  - TSH results: pending    Pt discussed during rounds with Dr. Remington POLLOCK Goals: Goals reviewed at interdisciplinary rounds with case management, social work, physical therapy, occupational therapy, and speech language pathology.   Please see specific therapy  notes for in depth goals.  Dispo: pending PT/OT

## 2025-03-03 NOTE — CONSULT NOTE ADULT - NSCONSULTADDITIONALINFOA_GEN_ALL_CORE
Pt void per BSC, was uncomfortable with PW in place.   DVT ppx: resume DOAC if no contraindication   Dispo: TBD

## 2025-03-03 NOTE — CONSULT NOTE ADULT - PROBLEM SELECTOR RECOMMENDATION 3
NSR on telemetry; [id number] at least 1 (HTN), higher if found to have a stroke  -- suggest resuming DOAC if no contraindication  -- monitor on telemetry

## 2025-03-03 NOTE — ED ADULT TRIAGE NOTE - CHIEF COMPLAINT QUOTE
Pt BIBA from work, around 0630 AM pt started with stuttering speech which is not his norm. Pt arrives and is still stuttering/repeating words. Otherwise pt is AOx4, no obvious weakness or deficits. No new vision changes. Pt was just started on Xarelto last week, hx of colon CA. Code stroke called at 0904.

## 2025-03-03 NOTE — ED ADULT NURSE NOTE - OBJECTIVE STATEMENT
Patient is a 60 y/o M c/o stuttering speech. Patient bibems from work and reports around 6/630, he began having stuttering speech. Patient continues to stutter and repeat his works. patient aaox4. Patient denies blurred vision. patient reports hx of colon cancer, Afib on Xarelto. Patient aaox4 otherwise.

## 2025-03-03 NOTE — CONSULT NOTE ADULT - PROBLEM SELECTOR RECOMMENDATION 5
Message left on voicemail to call clinic back.  Pt had reproductive culture and smear 3/14/17 which is negative.   s/p RT

## 2025-03-03 NOTE — ED PROVIDER NOTE - OBJECTIVE STATEMENT
59-year-old male with past medical history of atrial fibrillation on Eliquis, hypertension, prostate cancer treated most recently with radiation discontinued in January, presents to the emergency department due to acute onset speech deficits, gait instability, right-sided sensory loss beginning sometime between 4 AM and 6:15 AM.  His wife saw him leave for work without symptoms at 4 AM.  He was alone at his job until 615 when a coworker found him to be abnormal.  Wife confirms that patient took all his medications this morning.  Patient denies any other ingestants today.

## 2025-03-03 NOTE — ED ADULT NURSE REASSESSMENT NOTE - NS ED NURSE REASSESS COMMENT FT1
eIcu ems huddle completed with ALEXIS Waller. patient in no acute distress. No IV drips started. ems comfortable with patient transfer.   VSS: 171/81, 92 HR, 16 RR, 100% on room air.  care continues

## 2025-03-03 NOTE — ED ADULT NURSE NOTE - NS ED NURSE RECORD ANOTHER VITAL SIGN
Yes
Patient is a 65 year old male complaining of palpitations- 30mg of adenosine, 20mg of cardizem, and 60 mcg of fentanyl for sciatica given en route

## 2025-03-03 NOTE — ED PROVIDER NOTE - PROGRESS NOTE DETAILS
MD VANNESSA, attending physician: after patient left ED, alcohol level resulted as 133. I spoke to patient before he left, and he said he drinks "occasionally," a few times a month.

## 2025-03-03 NOTE — H&P ADULT - NSHPSOCIALHISTORY_GEN_ALL_CORE
SOCIAL HISTORY:   Patient lives with family  Smoking status: NA  Drinking: Daily- vodka   Drug Use: NA

## 2025-03-03 NOTE — H&P ADULT - NSHPLABSRESULTS_GEN_ALL_CORE
LABS:                        11.8   3.12  )-----------( 216      ( 03 Mar 2025 09:10 )             34.2     03-03    142  |  104  |  22  ----------------------------<  109[H]  4.5   |  21[L]  |  1.06    Ca    9.8      03 Mar 2025 09:10  Phos  3.3     03-03  Mg     1.9     -03    TPro  8.8[H]  /  Alb  4.0  /  TBili  0.5  /  DBili  x   /  AST  22  /  ALT  20  /  AlkPhos  128[H]  03-03    PT/INR - ( 03 Mar 2025 09:10 )   PT: 10.9 sec;   INR: 0.94          PTT - ( 03 Mar 2025 09:10 )  PTT:34.0 sec      Urinalysis Basic - ( 03 Mar 2025 10:26 )    Color: Yellow / Appearance: Clear / S.010 / pH: x  Gluc: x / Ketone: Trace mg/dL  / Bili: Negative / Urobili: 0.2 mg/dL   Blood: x / Protein: 30 mg/dL / Nitrite: Negative   Leuk Esterase: Negative / RBC: 1 /HPF / WBC 0 /HPF   Sq Epi: x / Non Sq Epi: x / Bacteria: Occasional /HPF      RADIOLOGY & ADDITIONAL STUDIES:    HCT: No acute intracranialhemorrhage, mass effect, or midline shift.    CTA:    CT PERFUSION:  CT perfusion metrics as above. Reported elevated Tmax in bilateral   inferior frontal lobes which may be artifactual as this does not   correlate with vascular territory.    CTA NECK:  No evidence of significant stenosis or occlusion.    CTA HEAD:  No large vessel occlusion, significant stenosis or vascular abnormality   identified.

## 2025-03-03 NOTE — ED ADULT NURSE NOTE - CAS DISCH BELONGINGS RETURNED
ASSESSMENT/PLAN:    Diagnoses and all orders for this visit:    Other tear of medial meniscus, current injury, right knee, initial encounter      MRI reviewed at today's visit. Presentation most consistent with right knee medial meniscal posterior horn root tear and we will plan for non-operative management at this time. Discussed surgical intervention in the form of knee arthroscopy with medial meniscal root repair. Discussed rehabilitation post operatively including initial 1 month of NWB and restriction of knee bending. Also presented option of conservative management which holds the predictable progression of rapid degeneration of cartilage and need for knee arthroplasty in the future. Patient would like to defer the meniscal repair at this time as she feels she can tolerate her pain currently and would rather just undergo one surgery if needed.   Discussed rehabilitation efforts. Will plan for continued HEP.   Discussed oral/topical medication regimen. Will plan for continued OTC meds  Discussed injections as a pain adjunct. Recommend CSI at today's visit to control pain which has not improved with oral medication and activity modification.  Follow-up with me 3 months for repeat eval.  _____________________________________________________  CHIEF COMPLAINT:  Chief Complaint   Patient presents with    Right Knee - Follow-up, Results, Pain     MRI 11/15/24  Pain states that the pain has gotten worse       SUBJECTIVE:  Cynthia Rangel is a 62 y.o. year old female who presents for follow up of right knee pain and MRI review. She notes that since her last visit, pain has worsened slightly. She continued to have pain worst to the medial aspect of the knee, worse with ambulation and with twisting of the upper body with knees planted. She continues to take ibuprofen about 3 times per day which dulls the pain. She denies numbness or tingling. She has held off on her normal exercise routine pending discussion of MRI  results.       PAST MEDICAL HISTORY:  Past Medical History:   Diagnosis Date    Disease of thyroid gland     Hyperlipidemia        PAST SURGICAL HISTORY:  Past Surgical History:   Procedure Laterality Date    ABDOMINAL SURGERY      4 surgeries    APPENDECTOMY      HERNIA REPAIR      OVARIAN CYST REMOVAL         FAMILY HISTORY:  History reviewed. No pertinent family history.    SOCIAL HISTORY:  Social History     Tobacco Use    Smoking status: Never    Smokeless tobacco: Never   Substance Use Topics    Alcohol use: No    Drug use: No       MEDICATIONS:    Current Outpatient Medications:     atorvastatin (LIPITOR) 10 mg tablet, Take 10 mg by mouth daily, Disp: , Rfl:     cetirizine (ZyrTEC) 10 MG chewable tablet, Chew 10 mg daily, Disp: , Rfl:     EPINEPHrine (EPIPEN) 0.3 mg/0.3 mL SOAJ, Inject 0.3 mg into a muscle, Disp: , Rfl:     ezetimibe (ZETIA) 10 mg tablet, Take 10 mg by mouth daily, Disp: , Rfl:     Ivermectin 1 % CREA, apply to affected area every day, Disp: , Rfl:     levothyroxine 100 mcg tablet, Take 100 mcg by mouth daily, Disp: , Rfl:     omalizumab (Xolair) subcutaneous injection, Inject 300 mg under the skin, Disp: , Rfl:     tacrolimus (PROTOPIC) 0.1 % ointment, Apply topically daily at bedtime To affected area, Disp: , Rfl:     atorvastatin (LIPITOR) 20 mg tablet, Take 20 mg by mouth daily at bedtime (Patient not taking: Reported on 10/28/2024), Disp: , Rfl:     pantoprazole (PROTONIX) 20 mg tablet, Take 20 mg by mouth daily, Disp: , Rfl:     pantoprazole (PROTONIX) 40 mg tablet, , Disp: , Rfl:     ALLERGIES:  Allergies   Allergen Reactions    Sunflower Oil - Food Allergy Hives    Metronidazole Rash       Review of systems:   Constitutional: Negative for fatigue, fever or loss of apetite.   HENT: Negative.    Respiratory: Negative for shortness of breath, dyspnea.    Cardiovascular: Negative for chest pain/tightness.   Gastrointestinal: Negative for abdominal pain, N/V.   Endocrine: Negative for  "cold/heat intolerance, unexplained weight loss/gain.   Genitourinary: Negative for flank pain, dysuria, hematuria.   Musculoskeletal: As in HPI   Skin: Negative for rash.    Neurological: Negative for numbness tingling  Psychiatric/Behavioral: Negative for agitation.  _____________________________________________________  PHYSICAL EXAMINATION:    Blood pressure 109/78, pulse 80, resp. rate 18, height 5' 8\" (1.727 m), weight 85.3 kg (188 lb).    General: well developed and well nourished, alert, oriented times 3, and appears comfortable  HEENT: Benign, normocephalic, atraumatic  Cardiovascular: regular rate    Pulmonary: No wheezing or stridor  Abdomen: Soft, Nontender  Skin: No masses, erythema, lacerations, fluctation, ulcerations  Neurovascular: as per MSK exam below    MUSCULOSKELETAL EXAMINATION:    Right knee  Mild antalgic gait  No bruising, swelling, or deformity  Trace effusion present  TTP medial joint line   ROM flexion 0-130 with discomfort at terminal flexion  Stable to varus and valgus stress at 0 and 30 degrees of flexion  Normal lachman  Negative steinmann  Negative alison  Negative anterior/posterior drawer     Large joint arthrocentesis: R knee  Tebbetts Protocol:  Procedure performed by:  Consent: Verbal consent obtained.  Risks and benefits: risks, benefits and alternatives were discussed  Consent given by: patient  Patient understanding: patient states understanding of the procedure being performed  Patient consent: the patient's understanding of the procedure matches consent given  Procedure consent: procedure consent matches procedure scheduled  Relevant documents: relevant documents present and verified  Test results: test results available and properly labeled  Site marked: the operative site was not marked  Radiology Images displayed and confirmed. If images not available, report reviewed: imaging studies available  Patient identity confirmed: verbally with patient  Supporting " Documentation  Indications: joint swelling, pain and diagnostic evaluation   Procedure Details  Location: knee - R knee  Preparation: Patient was prepped and draped in the usual sterile fashion  Needle size: 22 G  Ultrasound guidance: no  Approach: lateral  Medications administered: 4 mL bupivacaine 0.25 %; 4 mL lidocaine 1 %; 20 mg triamcinolone acetonide 10 mg/mL    Patient tolerance: patient tolerated the procedure well with no immediate complications  Dressing:  Sterile dressing applied        _____________________________________________________  STUDIES REVIEWED:  Images personally reviewed by me today:    MRI of the right knee taken on 11/15/24 demonstrates chondromalacia of the medial tibiofemoral compartment. Also demonstrated is radial tear posterior horn medial meniscus adjacent to root concerning for root tear,       Scribe Attestation      I,:  Scotty Robles PA-C am acting as a scribe while in the presence of the attending physician.:       I,:  Jaun Garza MD personally performed the services described in this documentation    as scribed in my presence.:             Not applicable

## 2025-03-03 NOTE — CONSULT NOTE ADULT - PROBLEM SELECTOR RECOMMENDATION 9
Pt. c/o stuttering speech, unsteady gait, and R-sided sensory loss, c/f stroke; in setting of A-fib (Pt. reports compliance w/ Xarelto); Pt. intoxicated, daily drinker, (+) BAL, likely contributing to sx  -- cont. work-up and mgmt per Neuro  -- PT/OT, speech therapy  -- suggest resuming DOAC if no contraindication, high-intensity statin  -- plan for MRI brain, TTE w/ bubble

## 2025-03-03 NOTE — H&P ADULT - NSHPREVIEWOFSYSTEMS_GEN_ALL_CORE
ROS: ***  Constitutional: No fever, weight loss or fatigue  Eyes: No eye pain, visual disturbances, or discharge  ENMT:  No difficulty hearing, tinnitus, vertigo; No sinus or throat pain  Neck: No pain or stiffness  Respiratory: No cough, wheezing, chills or hemoptysis  Cardiovascular: No chest pain, palpitations, shortness of breath, dizziness or leg swelling  Gastrointestinal: No abdominal pain. No nausea, vomiting or hematemesis; No diarrhea or constipation. Nohematochezia.  Genitourinary: No dysuria, frequency, hematuria or incontinence  Neurological: As per HPI  Skin: No itching, burning, rashes or lesions   Endocrine: No heat or cold intolerance; No hair loss  Musculoskeletal: No joint pain or swelling; No muscle, back or extremity pain  Psychiatric: No depression, anxiety, mood swings or difficulty sleeping  Heme/Lymph: No easy bruising or bleeding gums ROS:   Constitutional: No fever, weight loss or fatigue  Eyes: No eye pain, visual disturbances, or discharge  ENMT:  No difficulty hearing, tinnitus, vertigo; No sinus or throat pain  Neck: No pain or stiffness  Respiratory: No cough, wheezing, chills or hemoptysis  Cardiovascular: No chest pain, palpitations, shortness of breath. Some dizziness   Gastrointestinal: No abdominal pain. No nausea, vomiting or hematemesis; No diarrhea or constipation  Genitourinary: No dysuria, frequency, hematuria or incontinence  Neurological: As per HPI  Skin: No itching, burning, rashes or lesions   Endocrine: No heat or cold intolerance; No hair loss  Musculoskeletal: No joint pain or swelling; No muscle, back or extremity pain  Psychiatric: No depression, anxiety, mood swings or difficulty sleeping

## 2025-03-03 NOTE — ED PROVIDER NOTE - PHYSICAL EXAMINATION
General: alert, oriented to person, time, place  Psych: mood appropriate  Head: normocephalic; atraumatic  Eyes: conjunctivae clear bilaterally, sclerae anicteric  ENT: no nasal flaring, patent nares  Cardio: Regular rate and rhythm; normal heart sounds  Resp: Clear to auscultation bilaterally  GI: Abdomen soft, nontender, nondistended  : No CVA tenderness  Neuro: Cranial nerves II through XII intact; diminished sensation on right side of face, right arm, right leg; no pronator drift; stuttering speech  Skin: No rashes or bruising noted  MSK: Normal movement of extremities  Lymph/Vasc: No LE edema

## 2025-03-03 NOTE — ED PROVIDER NOTE - CLINICAL SUMMARY MEDICAL DECISION MAKING FREE TEXT BOX
In summary, patient presenting with multiple neurologic deficits and an unclear timeframe.  Based upon earliest last known normal, patient outside window for tenecteplase administration.  The noncontrast CT head was reviewed with radiology and confirmed to have no intracranial hemorrhage.  Case discussed on the phone with Dr. Macedo stroke neurology who is recommending admission to [facility]. In summary, patient presenting with multiple neurologic deficits and an unclear timeframe.  Based upon earliest last known normal, patient outside window for tenecteplase administration.  The noncontrast CT head was reviewed with radiology and confirmed to have no intracranial hemorrhage.  Case discussed on the phone with Dr. Macedo stroke neurology who is recommending admission to [facility].    EKG performed at 9:37 AM independently reviewed by me, showing normal sinus rhythm with a ventricular rate of 89 bpm, IN interval of 156 ms, QRS duration of 70 ms, QTc (Baz) of 445 ms. there are no ST segment changes and no T wave abnormalities.

## 2025-03-03 NOTE — H&P ADULT - HISTORY OF PRESENT ILLNESS
59-year-old male with PMHx  of atrial fibrillation compliant on Xarelto, hypertension, prostate cancer treated most recently with radiation in January presented to [facility] ER due to acute onset speech deficits, gait instability, right-sided sensory loss beginning sometime between 4 AM and 6:15 AM  3/3.  Patient was transferred to [facility] for stroke workup. Upon arrival , alcohol blood level was 133, pt was intoxicated. Per the wife, she saw  him leave for work without symptoms at 4 AM.  He was alone at his job until 615 when a coworker found him to be abnormal, stuttering his words and unable to lift his right side/ Wife states that this type of event has happened  twice before on two separate ER vists, after the patient takes his morning meds ( right side weakness, slurring of words). Wife also states that pt is a hevay drinker, and drinks mostly vodka every single day.  On assessment, pt is stutteirng words and able to follow some commands while intoxicated. Strengths upper and lower extremities is 5/5, unable to perform remainder of exam due to intoxication. CTH negative.   59-year-old male with PMHx  of atrial fibrillation compliant on Xarelto, hypertension, prostate cancer treated most recently with radiation in January presented to [facility] ER due to acute onset speech deficits, gait instability, right-sided sensory loss beginning sometime between 4 AM and 6:15 AM  3/3.  Patient was transferred to [facility] for stroke workup. Upon arrival , alcohol blood level was 133, Pt was intoxicated. Per the wife, she saw  him leave for work without symptoms at 4 AM.  He was alone at his job until 615 when a coworker found him to be abnormal, stuttering his words and unable to lift his right side. Wife states that this type of event has happened  twice before on two separate ER visits after the patient takes his morning meds ( right side weakness, slurring of words). Wife also states that pt is a heavy drinker, and drinks mostly vodka every single day.  On assessment, pt is stutteirng words and able to follow some commands while intoxicated. Strengths upper and lower extremities is 5/5, unable to perform remainder of intial exam due to intoxication. Subsequent exam was nonfocal, with some gait instability because "the room was still swaying". NIHSS 0. CTH negative.

## 2025-03-03 NOTE — ED ADULT NURSE NOTE - SUICIDE SCREENING QUESTION 2
Patient unable to complete
82 yo male presents to the ED, via waiting room from home, s/p unwitnessed fall. Wife is at bedside translating, states he fell at 05:30 this morning. Unable to recall LOC, head trauma, or injury. Stated he felt weak prior to falling, wife found  laying on left side on bathroom floor. States taking ambien every night "because he cannot sleep." Went back to bed with no s/s. Complaining of a headache, wife administered 2 tylenol with no relief. Patient is denying dizziness, vision changes. PMH of Lung Ca, leukemia, stent placed 1 year ago. A&Ox3, appears to be sleeping in bed. +2 pulses B/L. Lung sounds clear B/L. Skin is warm, dry, with purpura and ecchymosis present B/L on upper extremities. Wife states since last chemo (11/08/19), patient has been scratching "because he feels itchy and it became like this." Patient has low platelet count per MD, has been taken off blood thinners in Oct. Denies dizziness, vision changes, chest pain, shortness of breath, abdominal pain, nausea, vomiting, diarrhea, fevers, chills, dysuria, hematuria, recent illness travel.

## 2025-03-03 NOTE — ED ADULT NURSE NOTE - NS ED TRIAGE CLINICAL UPGRADE
DISPLAY PLAN FREE TEXT
Deteriorating patient status - Patient was clinically upgraded due to deteriorating patient status.

## 2025-03-04 ENCOUNTER — TRANSCRIPTION ENCOUNTER (OUTPATIENT)
Age: 60
End: 2025-03-04

## 2025-03-04 VITALS
RESPIRATION RATE: 20 BRPM | DIASTOLIC BLOOD PRESSURE: 88 MMHG | SYSTOLIC BLOOD PRESSURE: 160 MMHG | HEART RATE: 70 BPM | OXYGEN SATURATION: 100 %

## 2025-03-04 DIAGNOSIS — E78.5 HYPERLIPIDEMIA, UNSPECIFIED: ICD-10-CM

## 2025-03-04 LAB
A1C WITH ESTIMATED AVERAGE GLUCOSE RESULT: 4.9 % — SIGNIFICANT CHANGE UP (ref 4–5.6)
ALBUMIN SERPL ELPH-MCNC: 4.3 G/DL — SIGNIFICANT CHANGE UP (ref 3.3–5)
ALP SERPL-CCNC: 119 U/L — SIGNIFICANT CHANGE UP (ref 40–120)
ALT FLD-CCNC: 10 U/L — SIGNIFICANT CHANGE UP (ref 10–45)
ANION GAP SERPL CALC-SCNC: 12 MMOL/L — SIGNIFICANT CHANGE UP (ref 5–17)
AST SERPL-CCNC: 16 U/L — SIGNIFICANT CHANGE UP (ref 10–40)
BASOPHILS # BLD AUTO: 0 K/UL — SIGNIFICANT CHANGE UP (ref 0–0.2)
BASOPHILS NFR BLD AUTO: 0 % — SIGNIFICANT CHANGE UP (ref 0–2)
BILIRUB SERPL-MCNC: 0.8 MG/DL — SIGNIFICANT CHANGE UP (ref 0.2–1.2)
BUN SERPL-MCNC: 16 MG/DL — SIGNIFICANT CHANGE UP (ref 7–23)
CALCIUM SERPL-MCNC: 9.6 MG/DL — SIGNIFICANT CHANGE UP (ref 8.4–10.5)
CHLORIDE SERPL-SCNC: 100 MMOL/L — SIGNIFICANT CHANGE UP (ref 96–108)
CO2 SERPL-SCNC: 27 MMOL/L — SIGNIFICANT CHANGE UP (ref 22–31)
CREAT SERPL-MCNC: 0.9 MG/DL — SIGNIFICANT CHANGE UP (ref 0.5–1.3)
EGFR: 97 ML/MIN/1.73M2 — SIGNIFICANT CHANGE UP
EGFR: 97 ML/MIN/1.73M2 — SIGNIFICANT CHANGE UP
EOSINOPHIL # BLD AUTO: 0.15 K/UL — SIGNIFICANT CHANGE UP (ref 0–0.5)
EOSINOPHIL NFR BLD AUTO: 5.3 % — SIGNIFICANT CHANGE UP (ref 0–6)
ESTIMATED AVERAGE GLUCOSE: 94 MG/DL — SIGNIFICANT CHANGE UP (ref 68–114)
GLUCOSE SERPL-MCNC: 119 MG/DL — HIGH (ref 70–99)
HCT VFR BLD CALC: 32.9 % — LOW (ref 39–50)
HGB BLD-MCNC: 11.4 G/DL — LOW (ref 13–17)
LYMPHOCYTES # BLD AUTO: 0.72 K/UL — LOW (ref 1–3.3)
LYMPHOCYTES # BLD AUTO: 25.3 % — SIGNIFICANT CHANGE UP (ref 13–44)
MAGNESIUM SERPL-MCNC: 2 MG/DL — SIGNIFICANT CHANGE UP (ref 1.6–2.6)
MCHC RBC-ENTMCNC: 33.9 PG — SIGNIFICANT CHANGE UP (ref 27–34)
MCHC RBC-ENTMCNC: 34.7 G/DL — SIGNIFICANT CHANGE UP (ref 32–36)
MCV RBC AUTO: 97.9 FL — SIGNIFICANT CHANGE UP (ref 80–100)
MONOCYTES # BLD AUTO: 0.36 K/UL — SIGNIFICANT CHANGE UP (ref 0–0.9)
MONOCYTES NFR BLD AUTO: 12.6 % — SIGNIFICANT CHANGE UP (ref 2–14)
NEUTROPHILS # BLD AUTO: 1.62 K/UL — LOW (ref 1.8–7.4)
NEUTROPHILS NFR BLD AUTO: 56.8 % — SIGNIFICANT CHANGE UP (ref 43–77)
PHOSPHATE SERPL-MCNC: 4.3 MG/DL — SIGNIFICANT CHANGE UP (ref 2.5–4.5)
PLATELET # BLD AUTO: 187 K/UL — SIGNIFICANT CHANGE UP (ref 150–400)
POTASSIUM SERPL-MCNC: 4 MMOL/L — SIGNIFICANT CHANGE UP (ref 3.5–5.3)
POTASSIUM SERPL-SCNC: 4 MMOL/L — SIGNIFICANT CHANGE UP (ref 3.5–5.3)
PROT SERPL-MCNC: 7.9 G/DL — SIGNIFICANT CHANGE UP (ref 6–8.3)
RBC # BLD: 3.36 M/UL — LOW (ref 4.2–5.8)
RBC # FLD: 12.1 % — SIGNIFICANT CHANGE UP (ref 10.3–14.5)
SODIUM SERPL-SCNC: 139 MMOL/L — SIGNIFICANT CHANGE UP (ref 135–145)
TSH SERPL-MCNC: 1.86 UIU/ML — SIGNIFICANT CHANGE UP (ref 0.27–4.2)
WBC # BLD: 2.86 K/UL — LOW (ref 3.8–10.5)
WBC # FLD AUTO: 2.86 K/UL — LOW (ref 3.8–10.5)

## 2025-03-04 PROCEDURE — 70450 CT HEAD/BRAIN W/O DYE: CPT | Mod: MC

## 2025-03-04 PROCEDURE — 80061 LIPID PANEL: CPT

## 2025-03-04 PROCEDURE — 70498 CT ANGIOGRAPHY NECK: CPT | Mod: MC

## 2025-03-04 PROCEDURE — 97165 OT EVAL LOW COMPLEX 30 MIN: CPT

## 2025-03-04 PROCEDURE — 99291 CRITICAL CARE FIRST HOUR: CPT

## 2025-03-04 PROCEDURE — 97161 PT EVAL LOW COMPLEX 20 MIN: CPT

## 2025-03-04 PROCEDURE — 81001 URINALYSIS AUTO W/SCOPE: CPT

## 2025-03-04 PROCEDURE — 99233 SBSQ HOSP IP/OBS HIGH 50: CPT

## 2025-03-04 PROCEDURE — 83735 ASSAY OF MAGNESIUM: CPT

## 2025-03-04 PROCEDURE — 84443 ASSAY THYROID STIM HORMONE: CPT

## 2025-03-04 PROCEDURE — 84100 ASSAY OF PHOSPHORUS: CPT

## 2025-03-04 PROCEDURE — 85025 COMPLETE CBC W/AUTO DIFF WBC: CPT

## 2025-03-04 PROCEDURE — 70496 CT ANGIOGRAPHY HEAD: CPT | Mod: MC

## 2025-03-04 PROCEDURE — 0042T: CPT | Mod: MC

## 2025-03-04 PROCEDURE — 80053 COMPREHEN METABOLIC PANEL: CPT

## 2025-03-04 PROCEDURE — 85610 PROTHROMBIN TIME: CPT

## 2025-03-04 PROCEDURE — 70551 MRI BRAIN STEM W/O DYE: CPT | Mod: MC

## 2025-03-04 PROCEDURE — 83036 HEMOGLOBIN GLYCOSYLATED A1C: CPT

## 2025-03-04 PROCEDURE — 84484 ASSAY OF TROPONIN QUANT: CPT

## 2025-03-04 PROCEDURE — 85730 THROMBOPLASTIN TIME PARTIAL: CPT

## 2025-03-04 PROCEDURE — 80307 DRUG TEST PRSMV CHEM ANLYZR: CPT

## 2025-03-04 PROCEDURE — 36415 COLL VENOUS BLD VENIPUNCTURE: CPT

## 2025-03-04 PROCEDURE — 82962 GLUCOSE BLOOD TEST: CPT

## 2025-03-04 RX ORDER — MIRTAZAPINE 30 MG/1
1 TABLET, FILM COATED ORAL
Qty: 30 | Refills: 0
Start: 2025-03-04 | End: 2025-04-02

## 2025-03-04 RX ORDER — ATORVASTATIN CALCIUM 80 MG/1
1 TABLET, FILM COATED ORAL
Qty: 30 | Refills: 0
Start: 2025-03-04 | End: 2025-04-02

## 2025-03-04 RX ADMIN — METOPROLOL SUCCINATE 50 MILLIGRAM(S): 50 TABLET, EXTENDED RELEASE ORAL at 07:08

## 2025-03-04 RX ADMIN — Medication 75 MILLILITER(S): at 04:10

## 2025-03-04 NOTE — OCCUPATIONAL THERAPY INITIAL EVALUATION ADULT - GENERAL OBSERVATIONS, REHAB EVAL
Pt's RN Ivone aware of intent to eval/tx; cleared Pt. Pt received in bathroom - +tele (detached), sidra. Pt agreeable to OT.

## 2025-03-04 NOTE — PHYSICAL THERAPY INITIAL EVALUATION ADULT - LEVEL OF INDEPENDENCE: SCOOT/BRIDGE, REHAB EVAL
Review of Systems/Medical History  Patient summary reviewed  Chart reviewed      Cardiovascular   Pulmonary  Smoker ,        GI/Hepatic            Endo/Other    Obesity    GYN       Hematology   Musculoskeletal       Neurology   Psychology       No results found for: WBC, HGB, HCT, MCV, PLT  No results found for: GLUCOSE, CALCIUM, NA, K, CO2, CL, BUN, CREATININE  No results found for: INR, PROTIME  No results found for: PTT    Physical Exam    Airway    Mallampati score: IV  TM Distance: >3 FB  Neck ROM: full     Dental   No notable dental hx     Cardiovascular  Cardiovascular exam normal    Pulmonary  Pulmonary exam normal     Other Findings        Anesthesia Plan  ASA Score- 2     Anesthesia Type- general with ASA Monitors  Additional Monitors:   Airway Plan: LMA  Comment: I personally discussed risks and benefits to this anesthetic  All patient questions were answered  Pt agrees with anesthesia plan        Plan Factors- Patient instructed to abstain from smoking on day of procedure  Patient did not smoke on day of surgery  Induction- intravenous  Postoperative Plan- Plan for postoperative opioid use  Planned trial extubation    Informed Consent- Anesthetic plan and risks discussed with patient  independent

## 2025-03-04 NOTE — PROGRESS NOTE ADULT - PROBLEM SELECTOR PLAN 5
-- although Pt. drinks EtOH regularly, agree w/ starting statin, given LFTs are WNL and there is no e/o acute or decompensated liver disease  -- that said, will need regular LFT monitoring as outpatient

## 2025-03-04 NOTE — DISCHARGE NOTE PROVIDER - HOSPITAL COURSE
Hospital course:  59-year-old male with PMHx of atrial fibrillation compliant on Xarelto, HTN, prostate CA tx recently with radiation in January presented to [facility] ED due to acute onset speech deficits, gait instability, right-sided sensory loss. Patient was transferred to [facility] for stroke workup. Upon arrival , alcohol blood level was 133, Pt was intoxicated. Per the wife, she saw  him leave for work without symptoms at 4 AM.  He was alone at his job until 615 when a coworker found him to be abnormal, stuttering his words and unable to lift his right side. Wife states that this type of event has happened  twice before on two separate ER visits after the patient takes his morning meds ( right side weakness, slurring of words). Wife also states that pt is a heavy drinker, and drinks mostly vodka every single day.  On assessment, pt is stuttering words and able to follow some commands while intoxicated. Strengths upper and lower extremities is 5/5, unable to perform remainder of initial exam due to intoxication. Subsequent exam was nonfocal, with some gait instability because "the room was still swaying". NIHSS 0. CTH negative. CTA neg. MRI short stroke neg.      During this hospital course, patient did not have a stroke    Patient had the following workup done in house:  CT Head: neg  MR Head Non Contrast: neg  CT Angio Head: neg  CT Angio Neck: neg  [x]labs  pending    Physical exam at discharge:    New medications on discharge:  Labs to be followed up:  Imaging to be done as outpatient:  Further outpatient workup:   Hospital course:  59-year-old male with PMHx of atrial fibrillation compliant on Xarelto, HTN, prostate CA tx recently with radiation in January presented to [facility] ED due to acute onset speech deficits, gait instability, right-sided sensory loss. Patient was transferred to [facility] for stroke workup. Upon arrival , alcohol blood level was 133, Pt was intoxicated. Per the wife, she saw  him leave for work without symptoms at 4 AM.  He was alone at his job until 615 when a coworker found him to be abnormal, stuttering his words and unable to lift his right side. Wife states that this type of event has happened  twice before on two separate ER visits after the patient takes his morning meds ( right side weakness, slurring of words). Wife also states that pt is a heavy drinker, and drinks mostly vodka every single day.  On assessment, pt is stuttering words and able to follow some commands while intoxicated. Strengths upper and lower extremities is 5/5, unable to perform remainder of initial exam due to intoxication. Subsequent exam was nonfocal, with some gait instability because "the room was still swaying". NIHSS 0. CTH negative. CTA neg. MRI short stroke neg.      During this hospital course, patient did not have a stroke.     Patient had the following workup done in house:  CT Head: neg  MR Head Non Contrast: neg  CT Angio Head: neg  CT Angio Neck: neg  [x]labs  pending    Physical exam at discharge:  General: No acute distress, awake and alert  Cardiovascular: Regular rate and rhythm  Pulmonary: No use of accessory muscles  GI: Abdomen soft, non-tender, non-distended    Neurologic:  -Mental status: Awake, alert, oriented to person, place, and time. Speech is fluent with intact naming, repetition, and comprehension, no dysarthria. Recent and remote memory intact. Follows commands. Attention/concentration intact. Fund of knowledge appropriate.   -Cranial nerves:   II: Visual fields are full to confrontation, some blurry vision on right side (cataracts).   III, IV, VI: Extraocular movements are intact without nystagmus. Pupils equally round and reactive to light  V:  Facial sensation V1-V3 equal and intact   VII: Face is symmetric with normal eye closure and smile  VIII: Hearing is bilaterally intact to finger rub  XII: Tongue protrudes midline  Motor: Normal bulk and tone. No pronator drift. Strength bilateral upper extremity 5/5, bilateral lower extremities 5/5.  Sensation: Intact to light touch bilaterally. No neglect or extinction on double simultaneous testing.  Coordination: No dysmetria on finger-to-nose     New medications on discharge: none   Labs to be followed up: none  Imaging to be done as outpatient: none  Further outpatient workup:   Hospital course:  59-year-old male with PMHx of atrial fibrillation compliant on Xarelto, HTN, prostate CA tx recently with radiation in January presented to [facility] ED due to acute onset speech deficits, gait instability, right-sided sensory loss. Patient was transferred to [facility] for stroke workup. Upon arrival , alcohol blood level was 133, Pt was intoxicated. Per the wife, she saw  him leave for work without symptoms at 4 AM.  He was alone at his job until 615 when a coworker found him to be abnormal, stuttering his words and unable to lift his right side. Wife states that this type of event has happened  twice before on two separate ER visits after the patient takes his morning meds ( right side weakness, slurring of words). Wife also states that pt is a heavy drinker, and drinks mostly vodka every single day.  On assessment, pt is stuttering words and able to follow some commands while intoxicated. Strengths upper and lower extremities is 5/5, unable to perform remainder of initial exam due to intoxication. Subsequent exam was nonfocal, with some gait instability because "the room was still swaying". NIHSS 0. CTH negative. CTA neg. MRI short stroke neg.      During this hospital course, patient did not have a stroke.     Patient had the following workup done in house:  CT Head: neg  MR Head Non Contrast: neg  CT Angio Head: neg  CT Angio Neck: neg  [x]labs  pending    Physical exam at discharge:  General: No acute distress, awake and alert  Cardiovascular: Regular rate and rhythm  Pulmonary: No use of accessory muscles  GI: Abdomen soft, non-tender, non-distended    Neurologic:  -Mental status: Awake, alert, oriented to person, place, and time. Speech is fluent with intact naming, repetition, and comprehension, no dysarthria. Recent and remote memory intact. Follows commands. Attention/concentration intact. Fund of knowledge appropriate.   -Cranial nerves:   II: Visual fields are full to confrontation, some blurry vision on right side (cataracts).   III, IV, VI: Extraocular movements are intact without nystagmus. Pupils equally round and reactive to light  V:  Facial sensation V1-V3 equal and intact   VII: Face is symmetric with normal eye closure and smile  VIII: Hearing is bilaterally intact to finger rub  XII: Tongue protrudes midline  Motor: Normal bulk and tone. No pronator drift. Strength bilateral upper extremity 5/5, bilateral lower extremities 5/5.  Sensation: Intact to light touch bilaterally. No neglect or extinction on double simultaneous testing.  Coordination: No dysmetria on finger-to-nose     New medications on discharge: none   Labs to be followed up: none  Imaging to be done as outpatient: none  Further outpatient workup:  Follow up 2 weeks w/ NP    Hospital course:  59-year-old male with PMHx of atrial fibrillation compliant on Xarelto, HTN, prostate CA tx recently with radiation in January presented to [facility] ED due to acute onset speech deficits, gait instability, right-sided sensory loss. Patient was transferred to [facility] for stroke workup. Upon arrival , alcohol blood level was 133, Pt was intoxicated. Per the wife, she saw  him leave for work without symptoms at 4 AM.  He was alone at his job until 615 when a coworker found him to be abnormal, stuttering his words and unable to lift his right side. Wife states that this type of event has happened  twice before on two separate ER visits after the patient takes his morning meds ( right side weakness, slurring of words). Wife also states that pt is a heavy drinker, and drinks mostly vodka every single day.  On assessment, pt is stuttering words and able to follow some commands while intoxicated. Strengths upper and lower extremities is 5/5, unable to perform remainder of initial exam due to intoxication. Subsequent exam was nonfocal, with some gait instability because "the room was still swaying". NIHSS 0. CTH negative. CTA neg. MRI short stroke neg.      During this hospital course, patient did not have a stroke.     Patient had the following workup done in house:  CT Head: neg  MR Head Non Contrast: neg  CT Angio Head: neg  CT Angio Neck: neg  [x]labs    A1c- 4.9   TSH- 1.860 uIU/mL  LDL- 120     Physical exam at discharge:  General: No acute distress, awake and alert  Cardiovascular: Regular rate and rhythm  Pulmonary: No use of accessory muscles  GI: Abdomen soft, non-tender, non-distended    Neurologic:  -Mental status: Awake, alert, oriented to person, place, and time. Speech is fluent with intact naming, repetition, and comprehension, no dysarthria. Recent and remote memory intact. Follows commands. Attention/concentration intact. Fund of knowledge appropriate.   -Cranial nerves:   II: Visual fields are full to confrontation, some blurry vision on right side (cataracts).   III, IV, VI: Extraocular movements are intact without nystagmus. Pupils equally round and reactive to light  V:  Facial sensation V1-V3 equal and intact   VII: Face is symmetric with normal eye closure and smile  VIII: Hearing is bilaterally intact to finger rub  XII: Tongue protrudes midline  Motor: Normal bulk and tone. No pronator drift. Strength bilateral upper extremity 5/5, bilateral lower extremities 5/5.  Sensation: Intact to light touch bilaterally. No neglect or extinction on double simultaneous testing.  Coordination: No dysmetria on finger-to-nose     New medications on discharge: none   Labs to be followed up: none  Imaging to be done as outpatient: none  Further outpatient workup:  Follow up 2 weeks w/ NP

## 2025-03-04 NOTE — OCCUPATIONAL THERAPY INITIAL EVALUATION ADULT - ADDITIONAL COMMENTS
Pt lives w/ his family in ground floor apt, x2 stairs to enter. Pt states that he was independent prior to admission and w/o prior use of AEs/DMEs.

## 2025-03-04 NOTE — PROGRESS NOTE ADULT - SUBJECTIVE AND OBJECTIVE BOX
Patient is a 59y old  Male who presents with a chief complaint of right sided weakness and speech impairment (04 Mar 2025 07:04)      INTERVAL HPI/OVERNIGHT EVENTS:    Pt. seen and examined earlier today  Pt. feels well, has no complaints  No tremor, sweating, HA, hallucinations, anxiety, N/V, dysarthria  OOB and ambulated w/ PT/OT    Review of Systems: 12 point review of systems otherwise negative    MEDICATIONS  (STANDING):  metoprolol succinate ER 50 milliGRAM(s) Oral daily  rivaroxaban 20 milliGRAM(s) Oral with dinner  sodium chloride 0.9%. 1000 milliLiter(s) (75 mL/Hr) IV Continuous <Continuous>  tamsulosin 0.4 milliGRAM(s) Oral daily    MEDICATIONS  (PRN):      Allergies    No Known Allergies    Intolerances          Vital Signs Last 24 Hrs  T(C): 37.2 (04 Mar 2025 09:18), Max: 37.2 (04 Mar 2025 09:18)  T(F): 98.9 (04 Mar 2025 09:18), Max: 98.9 (04 Mar 2025 09:18)  HR: 70 (04 Mar 2025 11:50) (64 - 92)  BP: 160/88 (04 Mar 2025 11:50) (153/81 - 186/86)  BP(mean): 112 (04 Mar 2025 11:50) (110 - 129)  RR: 20 (04 Mar 2025 11:50) (19 - 30)  SpO2: 100% (04 Mar 2025 11:50) (98% - 100%)    Parameters below as of 04 Mar 2025 11:50  Patient On (Oxygen Delivery Method): room air      CAPILLARY BLOOD GLUCOSE          03-03 @ 07:01  -  03-04 @ 07:00  --------------------------------------------------------  IN: 0 mL / OUT: 1150 mL / NET: -1150 mL    03-04 @ 07:01  -  03-04 @ 14:03  --------------------------------------------------------  IN: 380 mL / OUT: 0 mL / NET: 380 mL        Physical Exam:  (earlier today)  Daily     Daily   General:  comfortable appearing in NAD  HEENT:  MMM, anicteric  CV:  RRR, no JVD  Lungs:  CTA B/L  Abdomen:  soft NT ND  Extremities:  no edema B/L LE  Skin:  WWP, no visible sweat  Neuro:  AAOx3, no tremor, no stuttering speech    LABS:                        11.4   2.86  )-----------( 187      ( 04 Mar 2025 05:30 )             32.9     03-04    139  |  100  |  16  ----------------------------<  119[H]  4.0   |  27  |  0.90    Ca    9.6      04 Mar 2025 05:30  Phos  4.3     03-04  Mg     2.0     03-04    TPro  7.9  /  Alb  4.3  /  TBili  0.8  /  DBili  x   /  AST  16  /  ALT  10  /  AlkPhos  119  03-04    PT/INR - ( 03 Mar 2025 09:10 )   PT: 10.9 sec;   INR: 0.94          PTT - ( 03 Mar 2025 09:10 )  PTT:34.0 sec  Urinalysis Basic - ( 04 Mar 2025 05:30 )    Color: x / Appearance: x / SG: x / pH: x  Gluc: 119 mg/dL / Ketone: x  / Bili: x / Urobili: x   Blood: x / Protein: x / Nitrite: x   Leuk Esterase: x / RBC: x / WBC x   Sq Epi: x / Non Sq Epi: x / Bacteria: x

## 2025-03-04 NOTE — PROGRESS NOTE ADULT - PROBLEM SELECTOR PLAN 7
unknown baseline; may be related to prostate cancer, EtOH use, and/or ethnicity  -- monitor CBC, clarify baseline

## 2025-03-04 NOTE — PROGRESS NOTE ADULT - PROBLEM SELECTOR PLAN 2
daily drinker, (+) BAL; no e/o withdrawal during admission, CIWAs < 8  -- serial CIWAs, monitor for withdrawal  -- IV thiamine, folate, MVI  --  SBPINO gutierrez

## 2025-03-04 NOTE — PHYSICAL THERAPY INITIAL EVALUATION ADULT - NSPTDISCHREC_GEN_A_CORE
96 patient is functionally independent and can be d/c from inpatient PT/No skilled PT needs patient is functionally independent, d/c from inpatient PT/No skilled PT needs

## 2025-03-04 NOTE — PROGRESS NOTE ADULT - PROBLEM SELECTOR PLAN 1
Pt. c/o stuttering speech, unsteady gait, and R-sided sensory loss, c/f stroke; in setting of A-fib (Pt. reports compliance w/ Xarelto); Pt. intoxicated, daily drinker, (+) BAL, likely contributing to sx; no e/o stroke on MRI, sx likely due to EtOH intoxication  -- cont. work-up and mgmt per Neuro  -- PT/OT, speech therapy  -- AUD mgmt as below

## 2025-03-04 NOTE — DISCHARGE NOTE PROVIDER - NSDCMRMEDTOKEN_GEN_ALL_CORE_FT
abiraterone 250 mg oral tablet: 4 tab(s) orally once a day  amLODIPine 5 mg oral tablet: 1 tab(s) orally once a day  amlodipine-benazepril: orally once a day dose is 5-20mg  Metoprolol Succinate ER 50 mg oral tablet, extended release: 1 tab(s) orally once a day  predniSONE 5 mg oral tablet: 1 tab(s) orally once a day  tamsulosin 0.4 mg oral capsule: 1 cap(s) orally once a day  Xarelto 20 mg oral tablet: 1 tab(s) orally once a day 6pm   abiraterone 250 mg oral tablet: 4 tab(s) orally once a day  amLODIPine 5 mg oral tablet: 1 tab(s) orally once a day  amlodipine-benazepril: orally once a day dose is 5-20mg  Metoprolol Succinate ER 50 mg oral tablet, extended release: 1 tab(s) orally once a day  mirtazapine 7.5 mg oral tablet: 1 tab(s) orally once a day (at bedtime) as needed for  insomnia  predniSONE 5 mg oral tablet: 1 tab(s) orally once a day  tamsulosin 0.4 mg oral capsule: 1 cap(s) orally once a day  Xarelto 20 mg oral tablet: 1 tab(s) orally once a day 6pm   abiraterone 250 mg oral tablet: 4 tab(s) orally once a day  amLODIPine 5 mg oral tablet: 1 tab(s) orally once a day  amlodipine-benazepril: orally once a day dose is 5-20mg  atorvastatin 80 mg oral tablet: 1 tab(s) orally  Metoprolol Succinate ER 50 mg oral tablet, extended release: 1 tab(s) orally once a day  mirtazapine 7.5 mg oral tablet: 1 tab(s) orally once a day (at bedtime) as needed for  insomnia  predniSONE 5 mg oral tablet: 1 tab(s) orally once a day  tamsulosin 0.4 mg oral capsule: 1 cap(s) orally once a day  Xarelto 20 mg oral tablet: 1 tab(s) orally once a day 6pm

## 2025-03-04 NOTE — CONSULT NOTE ADULT - ASSESSMENT
Neurology     59-year-old male with PMHx  of atrial fibrillation compliant on Xarelto, hypertension, prostate cancer treated most recently with radiation in January presented to [facility] ER due to acute onset speech deficits, gait instability, right-sided sensory loss beginning sometime between 4 AM and 6:15 AM  3/3.  Patient was transferred to [facility] for stroke workup. Upon arrival , alcohol blood level was 133, Pt was intoxicated. Per the wife, she saw  him leave for work without symptoms at 4 AM.  He was alone at his job until 615 when a coworker found him to be abnormal, stuttering his words and unable to lift his right side. Wife states that this type of event has happened  twice before on two separate ER visits after the patient takes his morning meds ( right side weakness, slurring of words). Wife also states that pt is a heavy drinker, and drinks mostly vodka every single day.  On assessment, pt is stutteirng words and able to follow some commands while intoxicated. Strengths upper and lower extremities is 5/5, unable to perform remainder of intial exam due to intoxication. Subsequent exam was nonfocal, with some gait instability because "the room was still swaying". NIHSS 0. CTH negative. MRI short stroke ordered. Admitted to stroke tele for further work up.     Neuro  #CVA workup   - Continue Xarelto 20mg daily - CTH neg for bleed  - q4hr stroke neuro checks and vitals  - obtain MRI Brain without contrast- short stroke   - Stroke Code HCT Results: No acute intracranial hemorrhage, mass effect, or midline shift.  - Stroke Code CTA Results:  CTA Head    No evidence of significant stenosis or occlusion.  CTA Neck  No large vessel occlusion, significant stenosis or vascular abnormality   identified.  - Stroke education  #Alcohol use disorder  - CIWA precuations   - Fluids 75ml/hr NS     Cards  #HTN #afib   - Goal -180  - Continue Metop succinate 50mg daily   - hold home Amlodipine for now  - LDL results: pending    Pulm  - call provider if SPO2 < 94%    GI  #Nutrition/Fluids/Electrolytes   - replete K<4 and Mg <2  - Diet:: Regular   - IVF: 75ml/hr NS    Genitourinary   #Prostate cancer - last radiation appt was 01/2025   - Continue flomax 0.4mg daily   - continue  Predisone 5mg daily   - Need onco to approve Abiraterone Acetate 250mg- pharmacy cannot approve on their own     Infectious Disease  - Trend WBC, fevers     Endocrine  - A1C results: pending  - TSH results: pending    Pt discussed during rounds with Dr. Remington POLLOCK Goals: Goals reviewed at interdisciplinary rounds with case management, social work, physical therapy, occupational therapy, and speech language pathology.   Please see specific therapy  notes for in depth goals.  Dispo: pending PT/OT       
60 y/o M w/

## 2025-03-04 NOTE — OCCUPATIONAL THERAPY INITIAL EVALUATION ADULT - PERTINENT HX OF CURRENT PROBLEM, REHAB EVAL
59-year-old male with PMHx  of atrial fibrillation compliant on Xarelto, hypertension, prostate cancer treated most recently with radiation in January presented to [facility] ER due to acute onset speech deficits, gait instability, right-sided sensory loss beginning sometime between 4 AM and 6:15 AM  3/3.  Patient was transferred to [facility] for stroke workup. Upon arrival , alcohol blood level was 133, Pt was intoxicated. Per the wife, she saw  him leave for work without symptoms at 4 AM.  He was alone at his job until 615 when a coworker found him to be abnormal, stuttering his words and unable to lift his right side. Wife states that this type of event has happened  twice before on two separate ER visits after the patient takes his morning meds ( right side weakness, slurring of words).

## 2025-03-04 NOTE — DISCHARGE NOTE PROVIDER - NSDCFUSCHEDAPPT_GEN_ALL_CORE_FT
[facility]  NEUROLOGY [address]  Scheduled Appointment: 03/21/2025    [facility]  NEUROLOGY [address]  Scheduled Appointment: 03/21/2025    [facility]  NEUROLOGY [address]  Scheduled Appointment: 03/21/2025

## 2025-03-04 NOTE — PHYSICAL THERAPY INITIAL EVALUATION ADULT - ADDITIONAL COMMENTS
Patient lives with his wife and 2 sons (19 and 24) in an elevator accessible apartment. PTA he was independent for all ADLs/IADLs with no AD. He works full time. Patient is R handed and wears glasses at all times for distance.

## 2025-03-04 NOTE — PHYSICAL THERAPY INITIAL EVALUATION ADULT - PERTINENT HX OF CURRENT PROBLEM, REHAB EVAL
59-year-old male presented to [facility] ER due to acute onset speech deficits, gait instability, right-sided sensory loss beginning sometime between 4 AM and 6:15 AM  3/3.  Patient was transferred to [facility] for stroke workup. Upon arrival , alcohol blood level was 133, Pt was intoxicated. Per the wife, she saw  him leave for work without symptoms at 4 AM.  He was alone at his job until 6:15 when a coworker found him to be abnormal, stuttering his words and unable to lift his right side. Wife states that this type of event has happened  twice before on two separate ER visits after the patient takes his morning meds ( right side weakness, slurring of words). Wife also states that pt is a heavy drinker, and drinks mostly vodka every single day.  On assessment, pt is stuttering words and able to follow some commands while intoxicated. Strengths upper and lower extremities is 5/5, unable to perform remainder of initial exam due to intoxication. Subsequent exam was nonfocal, with some gait instability because "the room was still swaying". NIHSS 0. CTH negative. MRI short stroke ordered. Admitted to stroke tele for further work up

## 2025-03-04 NOTE — CONSULT NOTE ADULT - SUBJECTIVE AND OBJECTIVE BOX
Patient is a 59y old  Male who presents with a chief complaint of right sided weakness and speech impairment (04 Mar 2025 01:41)      HPI:  59-year-old male with PMHx  of atrial fibrillation compliant on Xarelto, hypertension, prostate cancer treated most recently with radiation in January presented to [facility] ER due to acute onset speech deficits, gait instability, right-sided sensory loss beginning sometime between 4 AM and 6:15 AM  3/3.  Patient was transferred to [facility] for stroke workup. Upon arrival , alcohol blood level was 133, Pt was intoxicated. Per the wife, she saw  him leave for work without symptoms at 4 AM.  He was alone at his job until 615 when a coworker found him to be abnormal, stuttering his words and unable to lift his right side. Wife states that this type of event has happened  twice before on two separate ER visits after the patient takes his morning meds ( right side weakness, slurring of words). Wife also states that pt is a heavy drinker, and drinks mostly vodka every single day.  On assessment, pt is stutteirng words and able to follow some commands while intoxicated. Strengths upper and lower extremities is 5/5, unable to perform remainder of intial exam due to intoxication. Subsequent exam was nonfocal, with some gait instability because "the room was still swaying". NIHSS 0. CTH negative.   (03 Mar 2025 13:53)    PAST MEDICAL & SURGICAL HISTORY:    MEDICATIONS  (STANDING):  metoprolol succinate ER 50 milliGRAM(s) Oral daily  rivaroxaban 20 milliGRAM(s) Oral with dinner  sodium chloride 0.9%. 1000 milliLiter(s) (75 mL/Hr) IV Continuous <Continuous>  tamsulosin 0.4 milliGRAM(s) Oral daily    MEDICATIONS  (PRN):          FAMILY HISTORY:      CBC Full  -  ( 04 Mar 2025 05:30 )  WBC Count : 2.86 K/uL  RBC Count : 3.36 M/uL  Hemoglobin : 11.4 g/dL  Hematocrit : 32.9 %  Platelet Count - Automated : 187 K/uL  Mean Cell Volume : 97.9 fl  Mean Cell Hemoglobin : 33.9 pg  Mean Cell Hemoglobin Concentration : 34.7 g/dL  Auto Neutrophil # : x  Auto Lymphocyte # : x  Auto Monocyte # : x  Auto Eosinophil # : x  Auto Basophil # : x  Auto Neutrophil % : x  Auto Lymphocyte % : x  Auto Monocyte % : x  Auto Eosinophil % : x  Auto Basophil % : x          142  |  104  |  22  ----------------------------<  109[H]  4.5   |  21[L]  |  1.06    Ca    9.8      03 Mar 2025 09:10  Phos  3.3       Mg     1.9         TPro  8.8[H]  /  Alb  4.0  /  TBili  0.5  /  DBili  x   /  AST  22  /  ALT  20  /  AlkPhos  128[H]        Urinalysis Basic - ( 03 Mar 2025 10:26 )    Color: Yellow / Appearance: Clear / S.010 / pH: x  Gluc: x / Ketone: Trace mg/dL  / Bili: Negative / Urobili: 0.2 mg/dL   Blood: x / Protein: 30 mg/dL / Nitrite: Negative   Leuk Esterase: Negative / RBC: 1 /HPF / WBC 0 /HPF   Sq Epi: x / Non Sq Epi: x / Bacteria: Occasional /HPF        Radiology :     < from: MR Head No Cont (25 @ 20:04) >    ACC: [id number] EXAM:  MR BRAIN   ORDERED BY: VU COSTELLO     PROCEDURE DATE:  2025          INTERPRETATION:  .    CLINICAL INFORMATION: Stroke.    TECHNIQUE: Axial DWI, axial ADC, and axial T2 FLAIR were obtained   utilizing a short stroke MRI brain protocol. .    COMPARISON: Prior CT code stroke series from earlier today.    FINDINGS: A few tiny nonspecific foci of T2/FLAIR hyperintensity are   noted throughout the deep and periventricular white matter of the   cerebral hemispheres. There is no associated mass effect. There is no   evidence of acute ischemia on the diffusion-weighted images.    Ventricular size and configuration is unremarkable. Flow-voids are noted   throughout the major intracranial vessels, on the T2 weighted images,   consistent with their patency. The sellar region and posterior fossa   appear unremarkable.    Extensive scattered mucosal thickening and secretions are seen throughout   the paranasal sinuses. The tympanomastoid cavities are clear. The   calvarium is intact. The orbits appear unremarkable.    IMPRESSION: No acute intracranial hemorrhage or evidence of acute         ACC: [id number] EXAM:  CT BRAIN STROKE PROTOCOL   ORDERED BY: HUGO KIM     PROCEDURE DATE:  2025          INTERPRETATION:  CLINICAL INFORMATION: Stroke Code, word finding   difficulty, right-sided numbness.    COMPARISON: CT head 9/3/2024    CONTRAST:  IV Contrast: None      TECHNIQUE:  Serial axial images were obtained from the skull base to the   vertex using multi-slice helical technique. Sagittal and coronal   reformats were obtained.    FINDINGS:    VENTRICLES AND SULCI: Normal insize and configuration.  INTRA-AXIAL: No mass effect, acute hemorrhage, or midline shift.  EXTRA-AXIAL: No mass or fluid collection. Basal cisterns are normal in   appearance.    VISUALIZED SINUSES:  There is complete opacification of the visualized   portion of the bilateral maxillary sinuses, near-complete opacification   of the right frontal sinus and right sphenoid sinus..  TYMPANOMASTOID CAVITIES:  Clear.  VISUALIZED ORBITS: Normal.  CALVARIUM: Intact.      IMPRESSION:  No acute intracranialhemorrhage, mass effect, or midline shift.    ACC: [id number] EXAM:  CT ANGIO NECK STROKE PROTCL IC   ORDERED BY: HUGO KIM     ACC: [id number] EXAM:  CT ANGIO BRAIN STROKE PROTC IC   ORDERED BY: HUGO KIM     ACC: [id number] EXAM:  CT BRAIN PERFUSION MAPS STROKE   ORDERED BY: HUGO KIM     PROCEDURE DATE:  2025          INTERPRETATION:  CLINICAL INFORMATION: Stroke Code, word finding   difficulty, right-sided numbness    COMPARISON: CT brain perfusion, CT angiogram head and neck 9/3/2024    CONTRAST:  IV Contrast: Omnipaque 350  45 cc administered (accession [id number]), 80   cc administered (accession [id number])  .    TECHNIQUE: CT perfusion and CTA of the head and neck were performed   following the intravenous administration of IV contrast. MIP   reconstructions wereperformed on a separate workstation and reviewed.   RAPID software was utilized for perfusion analysis.    FINDINGS:    CT PERFUSION:    TECHNICAL LIMITATIONS: None.    CBF<30%: 0 mL  TMAX>6s: 10 mL in the inferior bilateral frontal lobes.  MISMATCH VOLUME: 10 mL  MISMATCH RATIO: Infinite      CTA NECK:    AORTIC ARCH AND VISUALIZED GREAT VESSELS: Within normal limits.    RIGHT:  COMMON CAROTID ARTERY: No significant stenosis to the carotid bifurcation.  INTERNAL CAROTID ARTERY: No significant stenosis based on NASCET criteria.  VERTEBRAL ARTERY: Normal in course and caliber to the intracranial   circulation.    LEFT:  COMMON CAROTID ARTERY: No significant stenosis to the carotid bifurcation.  INTERNAL CAROTID ARTERY: No significant stenosis based on NASCET criteria.  VERTEBRAL ARTERY: Normal in course and caliber to the intracranial   circulation.    VISUALIZED LUNGS: Clear.    CAROTID STENOSIS REFERENCE: Percent (%) stenosis is expressed in terms of   NASCET Criteria. (NASCET = 100x1-(N/D)). N=greatest narrowing. D=normal   distal diameter - MILD = <50% stenosis. - MODERATE = 50-69% stenosis. -   SEVERE = 70-89% stenosis. - HAIRLINE/CRITICAL = 90-99% stenosis. -   OCCLUDED = 100% stenosis.      CTA HEAD:    INTERNAL CAROTID ARTERIES: Bilateral petrous, precavernous, cavernous,   and supraclinoid regions are patent without significant stenosis.    [place]: No aneurysm identified.    ANTERIOR CEREBRAL ARTERIES: No significant stenosis or occlusion.  MIDDLE CEREBRAL ARTERIES: No significant stenosis or occlusion.  POSTERIOR CEREBRAL ARTERIES: No significant stenosis or occlusion.    DISTAL VERTEBRAL / BASILAR ARTERIES: No significant stenosis or occlusion.    VENOUS STRUCTURES: Inadequate venous phase opacification.      IMPRESSION:    CT PERFUSION:  CT perfusion metrics as above. Reported elevated Tmax in bilateral   inferior frontal lobes which may be artifactual as this does not   correlate with vascular territory.    CTA NECK:  No evidence of significant stenosis or occlusion.    CTA HEAD:  No large vessel occlusion, significant stenosis or vascular abnormality   identified.       Review of Systems : per HPI         Vital Signs Last 24 Hrs  T(C): 37 (04 Mar 2025 05:33), Max: 37.1 (03 Mar 2025 10:25)  T(F): 98.6 (04 Mar 2025 05:33), Max: 98.8 (03 Mar 2025 10:25)  HR: 64 (04 Mar 2025 05:02) (64 - 98)  BP: 162/88 (04 Mar 2025 05:02) (153/81 - 182/87)  BP(mean): 119 (04 Mar 2025 05:02) (110 - 119)  RR: 21 (04 Mar 2025 05:02) (16 - 22)  SpO2: 98% (04 Mar 2025 05:02) (98% - 100%)    Parameters below as of 03 Mar 2025 16:30  Patient On (Oxygen Delivery Method): room air            Physical Exam:   59 y o man lying comfortably in semi Bragg's position , awake , alert , no acute complaints     Head: normocephalic , atraumatic    Eyes: PERRLA , EOMI , no nystagmus , sclera anicteric    ENT / FACE: neg nasal discharge , uvula midline , no oropharyngeal erythema / exudate    Neck: supple , negative JVD , negative carotid bruits , no thyromegaly    Chest: CTA bilaterally     Cardiovascular: regular rate and rhythm , neg murmurs / rubs / gallops    Abdomen: soft , non distended , no tenderness to palpation in all 4 quadrants ,  normal bowel sounds     Extremities: WWP , neg cyanosis /clubbing / edema     Neurologic Exam:     Alert and oriented to person , place , date/year , speech fluent w/o dysarthria , follows commands , recent and remote memory intact , repetition intact , comprehension intact ,  attention/concentration intact , fund of knowledge appropriate    Cranial Nerves:           II:                         pupils equal , round and reactive to light , visual fields intact         III/ IV/VI:             extraocular movements intact , neg nystagmus , neg ptosis        V:                        facial sensation intact , V1-3 normal        VII:                      face symmetric , no droop , normal eye closure and smile        VIII:                     hearing intact to finger rub bilaterally        IX and X:             no hoarseness , gag intact , palate/ uvula rise symmetrically        XI:                       SCM / trapezius strength intact bilateral        XII:                      no tongue deviation    Motor Exam:        > 4/5 x 4 extremities , without drift     Sensation:         intact to light touch x 4 extremities                            no neglect or extinction on double simultaneous testing    DTR:           biceps/brachioradialis: equal                            patella/ankle: equal          neg Babinski / Finnegan's sign / clonus    Coordination:            Finger to Nose:  neg dysmetria bilaterally        Gait:  not tested         PM&R Impression: admitted from [facility] ER due to acute onset speech deficits, gait instability, right-sided sensory loss w/ NIHSS 0    - no acute pathology on CT and MRI brain imaging     - no focal weakness      Recommendations / Plan:       1) Physical / Occupational therapy focusing on therapeutic exercises , equipment evaluation , bed mobility/transfer out of bed evaluation , progressive ambulation with assistive devices prn .    2) Current disposition plan recommendation:    pending functional progress     
  Patient is a 59y old  Male who presents with a chief complaint of right sided weakness and speech impairment (03 Mar 2025 13:53)    HPI:  59-year-old male with PMHx  of atrial fibrillation compliant on Xarelto, hypertension, prostate cancer treated most recently with radiation in January presented to [facility] ER due to acute onset speech deficits, gait instability, right-sided sensory loss beginning sometime between 4 AM and 6:15 AM  3/3.  Patient was transferred to [facility] for stroke workup. Upon arrival , alcohol blood level was 133, pt was intoxicated. Per the wife, she saw  him leave for work without symptoms at 4 AM.  He was alone at his job until 615 when a coworker found him to be abnormal, stuttering his words and unable to lift his right side/ Wife states that this type of event has happened  twice before on two separate ER vists, after the patient takes his morning meds ( right side weakness, slurring of words). Wife also states that pt is a hevay drinker, and drinks mostly vodka every single day.  On assessment, pt is stutteirng words and able to follow some commands while intoxicated. Strengths upper and lower extremities is 5/5, unable to perform remainder of exam due to intoxication. CTH negative.   (03 Mar 2025 13:53)      Review of Systems: 12 point review of systems otherwise negative    PAST MEDICAL & SURGICAL HISTORY:  A-fib  HTN  Prostate cancer s/p RT    Social History:  SOCIAL HISTORY:   Patient lives with family  Smoking status: NA  Drinking: Daily- vodka   Drug Use: NA (03 Mar 2025 13:53)      MEDICATIONS  (STANDING):  sodium chloride 0.9%. 1000 milliLiter(s) (75 mL/Hr) IV Continuous <Continuous>    MEDICATIONS  (PRN):      Allergies    No Known Allergies    Intolerances          Vital Signs Last 24 Hrs  T(C): 37.1 (03 Mar 2025 12:00), Max: 37.1 (03 Mar 2025 10:25)  T(F): 98.8 (03 Mar 2025 12:00), Max: 98.8 (03 Mar 2025 10:25)  HR: 91 (03 Mar 2025 11:30) (87 - 98)  BP: 160/80 (03 Mar 2025 11:30) (156/89 - 182/87)  BP(mean): 115 (03 Mar 2025 11:30) (115 - 115)  RR: 20 (03 Mar 2025 11:30) (16 - 22)  SpO2: 98% (03 Mar 2025 11:30) (98% - 100%)    Parameters below as of 03 Mar 2025 11:30  Patient On (Oxygen Delivery Method): room air      CAPILLARY BLOOD GLUCOSE      POCT Blood Glucose.: 109 mg/dL (03 Mar 2025 09:06)       @ 07:01  -  - @ 14:24  --------------------------------------------------------  IN: 0 mL / OUT: 650 mL / NET: -650 mL        Physical Exam:  (earlier today)  Daily Height in cm: 160.02 (03 Mar 2025 11:30)    Daily   General:  comfortable appearing in NAD  HEENT:  MMM  CV:  RRR, no JVD  Lungs:  CTA B/L  Abdomen:  soft NT ND  Extremities:  no edema B/L LE  Skin:  WWP, no visible sweat  Neuro:  AAOx3, no tremor, no stuttering speech  gait deferred      LABS:                        11.8   3.12  )-----------( 216      ( 03 Mar 2025 09:10 )             34.2     -03    142  |  104  |  22  ----------------------------<  109[H]  4.5   |  21[L]  |  1.06    Ca    9.8      03 Mar 2025 09:10  Phos  3.3     03-03  Mg     1.9     -03    TPro  8.8[H]  /  Alb  4.0  /  TBili  0.5  /  DBili  x   /  AST  22  /  ALT  20  /  AlkPhos  128[H]  03-03    PT/INR - ( 03 Mar 2025 09:10 )   PT: 10.9 sec;   INR: 0.94          PTT - ( 03 Mar 2025 09:10 )  PTT:34.0 sec  Urinalysis Basic - ( 03 Mar 2025 10:26 )    Color: Yellow / Appearance: Clear / S.010 / pH: x  Gluc: x / Ketone: Trace mg/dL  / Bili: Negative / Urobili: 0.2 mg/dL   Blood: x / Protein: 30 mg/dL / Nitrite: Negative   Leuk Esterase: Negative / RBC: 1 /HPF / WBC 0 /HPF   Sq Epi: x / Non Sq Epi: x / Bacteria: Occasional /HPF

## 2025-03-04 NOTE — OCCUPATIONAL THERAPY INITIAL EVALUATION ADULT - MD ORDER
Acute onset of speech deficits, gait instability, R sided sensory loss  S/P Stroke code  CTs and MRI negative  Dx: Alcohol use Disorder

## 2025-03-04 NOTE — DISCHARGE NOTE PROVIDER - NSDCCPCAREPLAN_GEN_ALL_CORE_FT
PRINCIPAL DISCHARGE DIAGNOSIS  Diagnosis: Atrial fibrillation  Assessment and Plan of Treatment: During this hospital admission you have  Atrial Fibrillation. This is an irregular, often rapid heart rate that commonly causes poor blood flow. The heart's upper chambers (atria) beat out of coordination with the lower chambers (ventricles). This condition may have no symptoms, but when symptoms do appear they include palpitations, shortness of breath, and fatigue. Treatments include drugs, electrical shock (cardioversion), and minimally invasive surgery (ablation). Continue to take your blood thinner and rate controlling medications as directed.

## 2025-03-04 NOTE — PHYSICAL THERAPY INITIAL EVALUATION ADULT - MODALITIES TREATMENT COMMENTS
UE/LE light tough sensation grossly intact; CN Testing: II: Visual fields are full to confrontation. III, IV, VI: intact except corrective saccade at upward vertical limit of visual field. V:  Facial sensation: intact  VII: Face is symmetrical VIII: Hearing intact bilaterally XI: Shoulder shrug intact XII: Tongue protrudes midline. UE/LE light tough sensation grossly intact; CN Testing: II: Visual fields are full to confrontation. III, IV, VI: intact except corrective saccade at upward vertical limit of visual field. V:  Facial sensation: intact  VII: Face is symmetrical VIII: Hearing intact bilaterally XI: Shoulder shrug intact XII: Tongue protrudes midline. Coordination Testing: bilateral finger to nose intact

## 2025-03-04 NOTE — PROGRESS NOTE ADULT - TIME BILLING
preparing to see Pt.; interviewing Pt.; examining Pt.; reviewing labs and images; documentation in [place]; d/c planning on IDRs; d/w Neuro ACP on rounds

## 2025-03-04 NOTE — DISCHARGE NOTE PROVIDER - NSDCFUADDAPPT_GEN_ALL_CORE_FT
Please see GLADYS Seo for your follow up appt on __ at ___.      Please see GLADYS Seo for your follow up appt on 3/21 at 3pm.   You will have US dopplers done at 2pm at the same location.       Please see GLADYS Seo for your follow up appt on 3/21 at 3pm.   You will have US dopplers done at 2pm at the same location.      You will be taking a statin for elevated LDLs. You will need to have your liver enzymes checked periodically.

## 2025-03-04 NOTE — DISCHARGE NOTE NURSING/CASE MANAGEMENT/SOCIAL WORK - NSDCPEXARELTO_GEN_ALL_CORE
Rivaroxaban/Xarelto - Compliance/Rivaroxaban/Xarelto - Dietary Advice/Rivaroxaban/Xarelto - Follow up monitoring/Rivaroxaban/Xarelto - Potential for adverse drug reactions and interactions O-T Advancement Flap Text: The defect edges were debeveled with a #15 scalpel blade.  Given the location of the defect, shape of the defect and the proximity to free margins an O-T advancement flap was deemed most appropriate.  Using a sterile surgical marker, an appropriate advancement flap was drawn incorporating the defect and placing the expected incisions within the relaxed skin tension lines where possible.    The area thus outlined was incised deep to adipose tissue with a #15 scalpel blade.  The skin margins were undermined to an appropriate distance in all directions utilizing iris scissors.

## 2025-03-04 NOTE — DISCHARGE NOTE NURSING/CASE MANAGEMENT/SOCIAL WORK - NSDCFUADDAPPT_GEN_ALL_CORE_FT
Please see GLADYS Seo for your follow up appt on 3/21 at 3pm.   You will have US dopplers done at 2pm at the same location.

## 2025-03-04 NOTE — DISCHARGE NOTE PROVIDER - CARE PROVIDER_API CALL
Toshia Seo NP in Family Health  [address] [address]  Phone: [phone]  Fax: [phone]  Scheduled Appointment: 03/21/2025 03:00 PM

## 2025-03-04 NOTE — DISCHARGE NOTE NURSING/CASE MANAGEMENT/SOCIAL WORK - PATIENT PORTAL LINK FT
You can access the FollowMyHealth Patient Portal offered by [facility] by registering at the following website: [email or web address]. By joining [facility]’s FollowMyHealth portal, you will also be able to view your health information using other applications (apps) compatible with our system.

## 2025-03-04 NOTE — PROGRESS NOTE ADULT - PROBLEM SELECTOR PLAN 4
BP has been elevated; EtOH use likely contributing  -- cont. DASH diet  -- resume home BP rx regimen (amlodipine and benazapril) at home doses

## 2025-03-04 NOTE — SBIRT NOTE ADULT - NSSBIRTUNABLESCROTHER_GEN_A_CORE
patient reports drinking sometimes, patient declined SBIRT and resources feels like is able to manage on his own

## 2025-03-11 DIAGNOSIS — D72.819 DECREASED WHITE BLOOD CELL COUNT, UNSPECIFIED: ICD-10-CM

## 2025-03-11 DIAGNOSIS — F10.229 ALCOHOL DEPENDENCE WITH INTOXICATION, UNSPECIFIED: ICD-10-CM
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
## 2025-03-11 DIAGNOSIS — Z79.01 LONG TERM (CURRENT) USE OF ANTICOAGULANTS: ICD-10-CM

## 2025-03-11 DIAGNOSIS — C61 MALIGNANT NEOPLASM OF PROSTATE: ICD-10-CM

## 2025-03-11 DIAGNOSIS — Z92.3 PERSONAL HISTORY OF IRRADIATION: ICD-10-CM

## 2025-03-11 DIAGNOSIS — I48.20 CHRONIC ATRIAL FIBRILLATION, UNSPECIFIED: ICD-10-CM

## 2025-03-11 DIAGNOSIS — I10 ESSENTIAL (PRIMARY) HYPERTENSION: ICD-10-CM

## 2025-03-11 DIAGNOSIS — E87.20 ACIDOSIS, UNSPECIFIED: ICD-10-CM

## 2025-03-21 ENCOUNTER — NON-APPOINTMENT (OUTPATIENT)
Age: 60
End: 2025-03-21

## 2025-03-21 ENCOUNTER — APPOINTMENT (OUTPATIENT)
Dept: NEUROLOGY | Facility: CLINIC | Age: 60
End: 2025-03-21
Payer: COMMERCIAL

## 2025-03-21 VITALS
BODY MASS INDEX: 27.66 KG/M2 | HEIGHT: 65 IN | HEART RATE: 62 BPM | SYSTOLIC BLOOD PRESSURE: 174 MMHG | OXYGEN SATURATION: 100 % | DIASTOLIC BLOOD PRESSURE: 100 MMHG | WEIGHT: 166 LBS | TEMPERATURE: 96.3 F

## 2025-03-21 DIAGNOSIS — G47.9 SLEEP DISORDER, UNSPECIFIED: ICD-10-CM

## 2025-03-21 DIAGNOSIS — F10.90 ALCOHOL USE, UNSPECIFIED, UNCOMPLICATED: ICD-10-CM

## 2025-03-21 DIAGNOSIS — R26.9 UNSPECIFIED ABNORMALITIES OF GAIT AND MOBILITY: ICD-10-CM

## 2025-03-21 DIAGNOSIS — Z78.9 OTHER SPECIFIED HEALTH STATUS: ICD-10-CM

## 2025-03-21 PROCEDURE — 93886 INTRACRANIAL COMPLETE STUDY: CPT

## 2025-03-21 PROCEDURE — 93880 EXTRACRANIAL BILAT STUDY: CPT

## 2025-03-21 PROCEDURE — 99205 OFFICE O/P NEW HI 60 MIN: CPT

## 2025-03-21 RX ORDER — RIVAROXABAN 20 MG/1
20 TABLET, FILM COATED ORAL
Refills: 0 | Status: ACTIVE | COMMUNITY

## 2025-03-21 RX ORDER — PREDNISONE 5 MG/1
5 TABLET ORAL
Refills: 0 | Status: ACTIVE | COMMUNITY

## 2025-03-21 RX ORDER — AMLODIPINE BESYLATE AND BENAZEPRIL HYDROCHLORIDE 5; 20 MG/1; MG/1
5-20 CAPSULE ORAL
Refills: 0 | Status: ACTIVE | COMMUNITY

## 2025-03-21 RX ORDER — ABIRATERONE ACETATE 250 MG/1
250 TABLET, FILM COATED ORAL
Refills: 0 | Status: ACTIVE | COMMUNITY

## 2025-03-21 RX ORDER — METOPROLOL SUCCINATE 50 MG/1
50 TABLET, EXTENDED RELEASE ORAL DAILY
Refills: 0 | Status: ACTIVE | COMMUNITY

## 2025-03-21 RX ORDER — ATORVASTATIN CALCIUM 80 MG/1
80 TABLET, FILM COATED ORAL DAILY
Refills: 0 | Status: ACTIVE | COMMUNITY

## 2025-03-21 RX ORDER — TAMSULOSIN HYDROCHLORIDE 0.4 MG/1
0.4 CAPSULE ORAL
Qty: 30 | Refills: 2 | Status: ACTIVE | COMMUNITY

## 2025-03-25 LAB
ALBUMIN SERPL ELPH-MCNC: 4.6 G/DL
ALP BLD-CCNC: 116 U/L
ALT SERPL-CCNC: 18 U/L
ANION GAP SERPL CALC-SCNC: 14 MMOL/L
APO LP(A) SERPL-MCNC: 178.2 NMOL/L
AST SERPL-CCNC: 21 U/L
BASOPHILS # BLD AUTO: 0.02 K/UL
BASOPHILS NFR BLD AUTO: 0.7 %
BILIRUB SERPL-MCNC: 0.5 MG/DL
BUN SERPL-MCNC: 25 MG/DL
CALCIUM SERPL-MCNC: 9.7 MG/DL
CHLORIDE SERPL-SCNC: 102 MMOL/L
CO2 SERPL-SCNC: 22 MMOL/L
CREAT SERPL-MCNC: 1.03 MG/DL
EGFRCR SERPLBLD CKD-EPI 2021: 83 ML/MIN/1.73M2
EOSINOPHIL # BLD AUTO: 0.02 K/UL
EOSINOPHIL NFR BLD AUTO: 0.7 %
FOLATE SERPL-MCNC: 8.2 NG/ML
GLUCOSE SERPL-MCNC: 98 MG/DL
HCT VFR BLD CALC: 31.7 %
HGB BLD-MCNC: 10.7 G/DL
IMM GRANULOCYTES NFR BLD AUTO: 0 %
LYMPHOCYTES # BLD AUTO: 0.77 K/UL
LYMPHOCYTES NFR BLD AUTO: 25.2 %
MAN DIFF?: NORMAL
MCHC RBC-ENTMCNC: 32.8 PG
MCHC RBC-ENTMCNC: 33.8 G/DL
MCV RBC AUTO: 97.2 FL
MONOCYTES # BLD AUTO: 0.4 K/UL
MONOCYTES NFR BLD AUTO: 13.1 %
NEUTROPHILS # BLD AUTO: 1.84 K/UL
NEUTROPHILS NFR BLD AUTO: 60.3 %
PLATELET # BLD AUTO: 263 K/UL
POTASSIUM SERPL-SCNC: 4.3 MMOL/L
PROT SERPL-MCNC: 7.8 G/DL
RBC # BLD: 3.26 M/UL
RBC # FLD: 12 %
SODIUM SERPL-SCNC: 138 MMOL/L
TSH SERPL-ACNC: 1.28 UIU/ML
VIT B12 SERPL-MCNC: 399 PG/ML
WBC # FLD AUTO: 3.05 K/UL

## 2025-03-27 LAB
HOMOCYSTEINE LEVEL: 20.6 UMOL/L
METHYLMALONATE SERPL-SCNC: 184 NMOL/L

## 2025-03-28 ENCOUNTER — NON-APPOINTMENT (OUTPATIENT)
Age: 60
End: 2025-03-28

## 2025-04-14 ENCOUNTER — EMERGENCY (EMERGENCY)
Facility: HOSPITAL | Age: 60
LOS: 1 days | End: 2025-04-14
Attending: EMERGENCY MEDICINE | Admitting: EMERGENCY MEDICINE
Payer: COMMERCIAL

## 2025-04-14 VITALS
TEMPERATURE: 98 F | HEART RATE: 66 BPM | SYSTOLIC BLOOD PRESSURE: 100 MMHG | RESPIRATION RATE: 19 BRPM | DIASTOLIC BLOOD PRESSURE: 56 MMHG | WEIGHT: 210.1 LBS | OXYGEN SATURATION: 98 % | HEIGHT: 63 IN

## 2025-04-14 LAB
ALBUMIN SERPL ELPH-MCNC: 2.9 G/DL — LOW (ref 3.4–5)
ALP SERPL-CCNC: 88 U/L — SIGNIFICANT CHANGE UP (ref 40–120)
ALT FLD-CCNC: 47 U/L — HIGH (ref 12–42)
ANION GAP SERPL CALC-SCNC: 8 MMOL/L — LOW (ref 9–16)
APTT BLD: 48.3 SEC — HIGH (ref 24.5–35.6)
AST SERPL-CCNC: 52 U/L — HIGH (ref 15–37)
BASOPHILS # BLD AUTO: 0.02 K/UL — SIGNIFICANT CHANGE UP (ref 0–0.2)
BASOPHILS NFR BLD AUTO: 0.6 % — SIGNIFICANT CHANGE UP (ref 0–2)
BILIRUB SERPL-MCNC: 0.6 MG/DL — SIGNIFICANT CHANGE UP (ref 0.2–1.2)
BUN SERPL-MCNC: 30 MG/DL — HIGH (ref 7–23)
CALCIUM SERPL-MCNC: 8.2 MG/DL — LOW (ref 8.5–10.5)
CHLORIDE SERPL-SCNC: 102 MMOL/L — SIGNIFICANT CHANGE UP (ref 96–108)
CO2 SERPL-SCNC: 24 MMOL/L — SIGNIFICANT CHANGE UP (ref 22–31)
CREAT SERPL-MCNC: 1.26 MG/DL — SIGNIFICANT CHANGE UP (ref 0.5–1.3)
EGFR: 65 ML/MIN/1.73M2 — SIGNIFICANT CHANGE UP
EGFR: 65 ML/MIN/1.73M2 — SIGNIFICANT CHANGE UP
EOSINOPHIL # BLD AUTO: 0.01 K/UL — SIGNIFICANT CHANGE UP (ref 0–0.5)
EOSINOPHIL NFR BLD AUTO: 0.3 % — SIGNIFICANT CHANGE UP (ref 0–6)
ETHANOL SERPL-MCNC: 118 MG/DL — HIGH
GLUCOSE SERPL-MCNC: 116 MG/DL — HIGH (ref 70–99)
HCT VFR BLD CALC: 30.1 % — LOW (ref 39–50)
HGB BLD-MCNC: 10.1 G/DL — LOW (ref 13–17)
IMM GRANULOCYTES # BLD AUTO: 0 K/UL — SIGNIFICANT CHANGE UP (ref 0–0.07)
IMM GRANULOCYTES NFR BLD AUTO: 0 % — SIGNIFICANT CHANGE UP (ref 0–0.9)
INR BLD: 3.82 — HIGH (ref 0.85–1.16)
LYMPHOCYTES # BLD AUTO: 0.78 K/UL — LOW (ref 1–3.3)
LYMPHOCYTES NFR BLD AUTO: 24 % — SIGNIFICANT CHANGE UP (ref 13–44)
MCHC RBC-ENTMCNC: 32.5 PG — SIGNIFICANT CHANGE UP (ref 27–34)
MCHC RBC-ENTMCNC: 33.6 G/DL — SIGNIFICANT CHANGE UP (ref 32–36)
MCV RBC AUTO: 96.8 FL — SIGNIFICANT CHANGE UP (ref 80–100)
MONOCYTES # BLD AUTO: 0.34 K/UL — SIGNIFICANT CHANGE UP (ref 0–0.9)
MONOCYTES NFR BLD AUTO: 10.5 % — SIGNIFICANT CHANGE UP (ref 2–14)
NEUTROPHILS # BLD AUTO: 2.1 K/UL — SIGNIFICANT CHANGE UP (ref 1.8–7.4)
NEUTROPHILS NFR BLD AUTO: 64.6 % — SIGNIFICANT CHANGE UP (ref 43–77)
NRBC # BLD AUTO: 0 K/UL — SIGNIFICANT CHANGE UP (ref 0–0)
NRBC # FLD: 0 K/UL — SIGNIFICANT CHANGE UP (ref 0–0)
NRBC BLD AUTO-RTO: 0 /100 WBCS — SIGNIFICANT CHANGE UP (ref 0–0)
PLATELET # BLD AUTO: 232 K/UL — SIGNIFICANT CHANGE UP (ref 150–400)
PMV BLD: 8.9 FL — SIGNIFICANT CHANGE UP (ref 7–13)
POTASSIUM SERPL-MCNC: 5.3 MMOL/L — SIGNIFICANT CHANGE UP (ref 3.5–5.3)
POTASSIUM SERPL-SCNC: 5.3 MMOL/L — SIGNIFICANT CHANGE UP (ref 3.5–5.3)
PROT SERPL-MCNC: 6.8 G/DL — SIGNIFICANT CHANGE UP (ref 6.4–8.2)
PROTHROM AB SERPL-ACNC: 44.1 SEC — HIGH (ref 9.9–13.4)
RBC # BLD: 3.11 M/UL — LOW (ref 4.2–5.8)
RBC # FLD: 12.4 % — SIGNIFICANT CHANGE UP (ref 10.3–14.5)
SODIUM SERPL-SCNC: 134 MMOL/L — SIGNIFICANT CHANGE UP (ref 132–145)
TROPONIN I, HIGH SENSITIVITY RESULT: 9.9 NG/L — SIGNIFICANT CHANGE UP
WBC # BLD: 3.25 K/UL — LOW (ref 3.8–10.5)
WBC # FLD AUTO: 3.25 K/UL — LOW (ref 3.8–10.5)

## 2025-04-14 PROCEDURE — 70498 CT ANGIOGRAPHY NECK: CPT | Mod: 26

## 2025-04-14 PROCEDURE — 0042T: CPT

## 2025-04-14 PROCEDURE — 70496 CT ANGIOGRAPHY HEAD: CPT | Mod: 26

## 2025-04-14 PROCEDURE — 99223 1ST HOSP IP/OBS HIGH 75: CPT

## 2025-04-14 PROCEDURE — 70450 CT HEAD/BRAIN W/O DYE: CPT | Mod: 26,59

## 2025-04-14 RX ORDER — ATORVASTATIN CALCIUM 80 MG/1
20 TABLET, FILM COATED ORAL ONCE
Refills: 0 | Status: COMPLETED | OUTPATIENT
Start: 2025-04-14 | End: 2025-04-14

## 2025-04-14 RX ORDER — METOPROLOL SUCCINATE 50 MG/1
50 TABLET, EXTENDED RELEASE ORAL ONCE
Refills: 0 | Status: COMPLETED | OUTPATIENT
Start: 2025-04-14 | End: 2025-04-14

## 2025-04-14 NOTE — ED PROVIDER NOTE - PHYSICAL EXAMINATION
Const: not in acute distress  Eyes: no conjunctival injection  HEENT: Head NCAT, Moist MM.  Neck: Trachea midline.   CVS: +S1/S2, No murmurs or gallops  RESP: Unlabored respiratory effort. Clear to auscultation bilaterally.  GI: Nontender/Nondistended, No CVA tenderness b/l.   Skin: Intact.   Neuro: CN2-12 grossly intact. EOMI. 5/5 strength in UE and LE b/l.  Sensation intact in UE/LE b/l b/l. Aphasia  Psych: Awake, Alert, & Cooperative

## 2025-04-14 NOTE — ED PROVIDER NOTE - OBJECTIVE STATEMENT
59-year-old male with past medical history of atrial fibrillation on Eliquis, hypertension, prostate cancer treated most recently with radiation discontinued in January, presents to the emergency department due to acute onset speech deficits since . As noticed by coworkers.  Patient states that patient recently admitted for evaluation and diagnosed with TIA with similar symptoms.  On chart review, patient seen on March 3rd with  speech deficits, gait instability, right-sided sensory loss. Patient received CT and MRI, without acute findings. 59-year-old male with past medical history of atrial fibrillation on Xeralto, hypertension, prostate cancer treated most recently with radiation discontinued in January, presents to the emergency department due to acute onset speech deficits since 6 AM. As noticed by coworkers.  Patient states that patient recently admitted for evaluation and diagnosed with TIA with similar symptoms.  On chart review, patient seen on March 3rd with  speech deficits, gait instability, right-sided sensory loss. Patient received CT and MRI, without acute findings.

## 2025-04-14 NOTE — ED PROVIDER NOTE - CHIEF COMPLAINT
The patient is a 59y Male complaining of slurred speech.
Principal Discharge DX:	Contusion of head  Secondary Diagnosis:	Contusion of finger

## 2025-04-14 NOTE — ED CDU PROVIDER INITIAL DAY NOTE - CLINICAL SUMMARY MEDICAL DECISION MAKING FREE TEXT BOX
59-year-old male with past medical history of atrial fibrillation on Xeralto, hypertension, prostate cancer treated most recently with radiation discontinued in January, presents to the emergency department due to acute onset speech deficits since 6 AM.  Vitals nonactionable. Neuro exam notable for aphasia.  NIHSS 1. Will obtain stroke workup, code stroke called for acute onset of symptoms.

## 2025-04-14 NOTE — ED CDU PROVIDER INITIAL DAY NOTE - OBJECTIVE STATEMENT
59-year-old male with past medical history of atrial fibrillation on Xeralto, hypertension, prostate cancer treated most recently with radiation discontinued in January, presents to the emergency department due to acute onset speech deficits since 6 AM. As noticed by coworkers.  Patient states that patient recently admitted for evaluation and diagnosed with TIA with similar symptoms.  On chart review, patient seen on March 3rd with  speech deficits, gait instability, right-sided sensory loss. Patient received CT and MRI, without acute findings.

## 2025-04-14 NOTE — ED ADULT NURSE NOTE - NSFALLUNIVINTERV_ED_ALL_ED
Bed/Stretcher in lowest position, wheels locked, appropriate side rails in place/Call bell, personal items and telephone in reach/Instruct patient to call for assistance before getting out of bed/chair/stretcher/Non-slip footwear applied when patient is off stretcher/[place] to call system/Physically safe environment - no spills, clutter or unnecessary equipment/Purposeful proactive rounding/Room/bathroom lighting operational, light cord in reach

## 2025-04-14 NOTE — ED ADULT NURSE NOTE - PAIN RATING/NUMBER SCALE (0-10): ACTIVITY
SW contacted pt to confirm ABU appt for 3/2/2018. No answer. SW left message to call back for admit instructions.    0 (no pain/absence of nonverbal indicators of pain)

## 2025-04-14 NOTE — ED PROVIDER NOTE - ATTENDING CONTRIBUTION TO CARE
Patient with history of A-fib on Xarelto presents with expressive aphasia which started this morning at 6 AM.  NIHSS 1.  Took Xarelto this morning.  Stroke imaging unrevealing.  Case discussed with telestroke neurologist Dr. Fuentes who recommends MRI with and without contrast and EEG.  Discussed case with Dr. Syed from general neurology who recommends keeping pt at [facility] for MRI noncontrast and pt can follow up as outpatient for the rest of the workup.  Case discussed with both Dr. Fuentes and Dr. Syed; will place pt on TIA obs with plan for MRI noncontrast and lipid panel and further serial neuro exams.

## 2025-04-14 NOTE — ED ADULT TRIAGE NOTE - CHIEF COMPLAINT QUOTE
Pt BIBEMS for eval of difficulty speaking on arrival to workplace this morning. Pt reports co-workers to cite him having hard time getting words out. No focal weakness, reports some lightheadedness. Reports hx of TIA w. similar sx presentation.

## 2025-04-14 NOTE — ED ADULT NURSE NOTE - OBJECTIVE STATEMENT
pt presents to the ED d/t complaints of slurred speech via co-workers. hx TIA in march. NIHSS 1, on xaralto for afib

## 2025-04-14 NOTE — ED PROVIDER NOTE - IV ALTEPLASE INCLUSION HIDDEN
Please resend to [employer] order as when we send the week supply to [facility] it Canceled the order for [facility] show

## 2025-04-14 NOTE — ED PROVIDER NOTE - CLINICAL SUMMARY MEDICAL DECISION MAKING FREE TEXT BOX
59-year-old male with past medical history of atrial fibrillation on Xeralto, hypertension, prostate cancer treated most recently with radiation discontinued in January, presents to the emergency department due to acute onset speech deficits since 6 AM.  Vitals nonactionable. 59-year-old male with past medical history of atrial fibrillation on Xeralto, hypertension, prostate cancer treated most recently with radiation discontinued in January, presents to the emergency department due to acute onset speech deficits since 6 AM.  Vitals nonactionable. Neuro exam notable for aphasia.  NIHSS 1. Will obtain stroke workup, code stroke called for acute onset of symptoms.

## 2025-04-14 NOTE — ED CDU PROVIDER INITIAL DAY NOTE - PROGRESS NOTE DETAILS
Spoke with the telestroke doctor who recommends that patient continue his home medications while in observation.

## 2025-04-14 NOTE — ED CDU PROVIDER INITIAL DAY NOTE - ATTENDING CONTRIBUTION TO CARE
Patient with history of A-fib on Xarelto presents with expressive aphasia which started this morning at 6 AM.  NIHSS 1.  Took Xarelto this morning.  Stroke imaging unrevealing.  Case discussed with telestroke neurologist Dr. Fuentes who recommends MRI with and without contrast and EEG.  Discussed case with Dr. Syed from general neurology who recommends keeping pt at [facility] for MRI noncontrast and pt can follow up as outpatient for the rest of the workup.  Case discussed with both Dr. Fuentes and Dr. Syed; will place pt on TIA obs with plan for MRI noncontrast and lipid panel and further serial neuro exams.

## 2025-04-15 VITALS
RESPIRATION RATE: 16 BRPM | SYSTOLIC BLOOD PRESSURE: 163 MMHG | DIASTOLIC BLOOD PRESSURE: 97 MMHG | TEMPERATURE: 99 F | HEART RATE: 61 BPM | OXYGEN SATURATION: 99 %

## 2025-04-15 PROCEDURE — 70551 MRI BRAIN STEM W/O DYE: CPT | Mod: 26

## 2025-04-15 PROCEDURE — 99239 HOSP IP/OBS DSCHRG MGMT >30: CPT

## 2025-04-15 RX ORDER — RIVAROXABAN 10 MG/1
20 TABLET, FILM COATED ORAL ONCE
Refills: 0 | Status: COMPLETED | OUTPATIENT
Start: 2025-04-15 | End: 2025-04-15

## 2025-04-15 RX ADMIN — RIVAROXABAN 20 MILLIGRAM(S): 10 TABLET, FILM COATED ORAL at 08:38

## 2025-04-15 RX ADMIN — METOPROLOL SUCCINATE 50 MILLIGRAM(S): 50 TABLET, EXTENDED RELEASE ORAL at 00:22

## 2025-04-15 RX ADMIN — ATORVASTATIN CALCIUM 20 MILLIGRAM(S): 80 TABLET, FILM COATED ORAL at 00:21

## 2025-04-15 NOTE — ED CDU PROVIDER DISPOSITION NOTE - NSFOLLOWUPINSTRUCTIONS_ED_ALL_ED_FT
Transient Ischemic Attack  A transient ischemic attack (TIA) causes stroke-like symptoms that go away quickly. Having a TIA means that a person is at higher risk for a stroke. A TIA happens when blood supply to the brain is blocked temporarily. A TIA is a medical emergency.    What are the causes?  This condition is caused by a temporary blockage in an artery in the head or neck. This means the brain does not get the blood supply it needs. There is no permanent brain damage with a TIA. A blockage can be caused by:  Fatty buildup in an artery in the head or neck (atherosclerosis).  A blood clot.  An artery tear (dissection).  Inflammation of an artery (vasculitis).  Sometimes the cause is not known.    What increases the risk?  Certain factors may make you more likely to develop this condition. Some of these are things that you can change, such as:  Obesity.  Using products that contain nicotine or tobacco.  Taking oral birth control, especially if you also use tobacco.  Not being active.  Heavy alcohol use.  Drug use, especially cocaine and methamphetamine.  Medical conditions that may increase your risk include:  High blood pressure (hypertension).  High cholesterol.  Diabetes.  Heart disease (coronary artery disease).  An irregular heartbeat, also called atrial fibrillation (AFib).  Sickle cell disease.  Sleep problems (sleep apnea).  Chronic inflammatory diseases, such as rheumatoid arthritis or lupus.  Blood clotting disorders (hypercoagulable state).  Other risk factors include:  Being over the age of 60.  Being male.  Family history of stroke.  Previous history of blood clots, stroke, TIA, or heart attack.  Having a history of preeclampsia.  Migraine headache.  What are the signs or symptoms?    Symptoms of a TIA are the same as those of a stroke. The symptoms develop suddenly, and then go away quickly. They may include:  Weakness or numbness in your face, arm, or leg, especially on one side of your body.  Trouble walking or moving your arms or legs.  Trouble speaking, understanding speech, or both (aphasia).  Vision changes, such as double vision, blurred vision, or loss of vision.  Dizziness.  Confusion.  Loss of balance or coordination.  Nausea and vomiting.  Severe headache.  If possible, note what time your symptoms started. Tell your health care provider.    How is this diagnosed?  This condition may be diagnosed based on:  Your symptoms and medical history.  A physical exam.  Imaging tests, usually a CT scan or MRI of the brain.  Blood tests.  You may also have other tests, including:  Electrocardiogram (ECG).  Echocardiogram.  Carotid ultrasound.  A scan of blood circulation in the brain (CT angiogram or MR angiogram).  Continuous heart monitoring.  How is this treated?  The goal of treatment is to reduce the risk for a stroke. Stroke prevention therapies may include:  Changes to diet and lifestyle, such as being physically active and stopping smoking.  Medicines to thin the blood (antiplatelets or anticoagulants).  Blood pressure medicines.  Medicines to reduce cholesterol.  Treating other health conditions, such as diabetes or AFib.  If testing shows a narrowing in the arteries to your brain, your health care provider may recommend a procedure, such as:  Carotid endarterectomy. This is done to remove the blockage from your artery.  Carotid angioplasty and stenting. This uses a tube (stent) to open or widen an artery in the neck. The stent helps keep the artery open by supporting the artery walls.  Follow these instructions at home:  Medicines    Take over-the-counter and prescription medicines only as told by your health care provider.  If you were told to take a medicine to thin your blood, such as aspirin or an anticoagulant, take it exactly as told by your health care provider.  Taking too much blood-thinning medicine can cause bleeding. Taking too little will not protect you against a stroke and other problems.  Eating and drinking      Eat 5 or more servings of fruits and vegetables each day.  Follow guidelines from your health care provider about your diet. You may need to follow a certain diet to help manage risk factors for stroke. This may include:  Eating a low-fat, low-salt diet.  Choosing high-fiber foods.  Limiting carbohydrates and sugar.  If you drink alcohol:  Limit how much you have to:  0–1 drink a day for women who are not pregnant.  0–2 drinks a day for men.  Know how much alcohol is in a drink. In the [place] one drink equals one 12 oz bottle of beer (355mL), one 5 oz glass of wine (148mL), or one 1½ oz glass of hard liquor (44mL).  General instructions    Maintain a healthy weight.  Try to get at least 30 minutes of exercise on most days.  Get treatment if you have sleep apnea.  Do not use any products that contain nicotine or tobacco. These products include cigarettes, chewing tobacco, and vaping devices, such as e-cigarettes. If you need help quitting, ask your health care provider.  Do not use drugs.  Keep all follow-up visits. This is important.  Where to find more information  American Stroke Association: www.stroke.org  Get help right away if:  You have chest pain or an irregular heartbeat.  You have any symptoms of a stroke. "BE FAST" is an easy way to remember the main warning signs of a stroke.  B - Balance. Signs are dizziness, sudden trouble walking, or loss of balance.  E - Eyes. Signs are trouble seeing or a sudden change in vision.  F - Face. Signs are sudden weakness or numbness of the face, or the face or eyelid drooping on one side.  A - Arms. Signs are weakness or numbness in an arm. This happens suddenly and usually on one side of the body.  S - Speech. Signs are sudden trouble speaking, slurred speech, or trouble understanding what people say.  T - Time. Time to call emergency services. Write down what time symptoms started.  You have other signs of a stroke, such as:  A sudden, severe headache with no known cause.  Nausea or vomiting.  Seizure.  These symptoms may represent a serious problem that is an emergency. Do not wait to see if the symptoms will go away. Get medical help right away. Call your local emergency services (911 in the [place] Do not drive yourself to the hospital.    Summary  A transient ischemic attack (TIA) happens when an artery in the head or neck is blocked. The blockage clears before there is any permanent brain damage. A TIA is a medical emergency.  Symptoms of a TIA are the same as those of a stroke. The symptoms develop suddenly, and then go away quickly.  Having a TIA means that you are at higher risk for a stroke.  The goal of treatment is to reduce your risk for a stroke. Treatment may include medicines to thin the blood and changes to diet and lifestyle.  This information is not intended to replace advice given to you by your health care provider. Make sure you discuss any questions you have with your health care provider.

## 2025-04-15 NOTE — ED CDU PROVIDER DISPOSITION NOTE - CARE PROVIDER_API CALL
Genna Dee  Neurology  [address] [address]  Phone: [phone]  Fax: [phone]  Follow Up Time:

## 2025-04-15 NOTE — CONSULT NOTE ADULT - SUBJECTIVE AND OBJECTIVE BOX
SUMMARY  59M with HTN, Afib (on Xarelto, last dose <3h PTA), prostate CA (radiation completed Jan 2025), recent [facility]-->[facility] Hospitalization 3.3.2025 (transient speech deficit/gait instability/R sensory vessel imaging and short stroke protocol MRI brain unremarkable, pt intoxicated with blood EToh), presenting with word finding difficulties noticed by co-workers at 630am 4/14. On arrival to ER 4/14, /83, NIHSS 1 (very mild aphasia/word finding problem). EtoH level 118. Head CT without infarct/hemorrhage, CTA head/neck without stenosis/occlusion (tortuous distal R ICA). CTP with right frontal and temporal perfusion deficit near skull base, in the absence of core -- likely artifactual.    Per note from his recent [facility] admission March 3rd, "Wife states that this type of event has happened  twice before on two separate ER visits after the patient takes his morning meds ( right side weakness, slurring of words). Wife also states that pt is a heavy drinker, and drinks mostly vodka every single day. On assessment, pt is stutteirng words and able to follow some commands while intoxicated."    ASSESSMENT  Pt with a repetitive stereotyped event in the absence of vascular lesion -- unlikely to be a vascular event although cannot exclude, and he has risk factors (management would not change other than to assess the status of his malignancy).     PLAN  - no tnk due to DOAC consumption within 3 hours of arrival  - no SMILEY, as no LVO  - he can continue on his Xarelto  - recommended MRI Brain +/- (EEG if negative) and discussion with general neurology (discussion took place in afternoon, gen neuro wanted MRI brain non contrast ok)  - vitals and neurochecks while waiting

## 2025-04-15 NOTE — ED CDU PROVIDER DISPOSITION NOTE - PATIENT PORTAL LINK FT
You can access the FollowMyHealth Patient Portal offered by [facility] by registering at the following website: [email or web address]. By joining [employer]’s FollowMyHealth portal, you will also be able to view your health information using other applications (apps) compatible with our system.

## 2025-04-15 NOTE — ED CDU PROVIDER DISPOSITION NOTE - CLINICAL COURSE
MRI negative.  Resident MD Dr. Knight discussed case with stroke team who recommend general neuro evaluation.  Case discussed with Dr. Dee from neurology; recommends outpatient follow up for further studies like MRI w/wo contrast and EEG.  Pt asymptomatic.  Stable for dc.

## 2025-04-16 ENCOUNTER — APPOINTMENT (OUTPATIENT)
Dept: SLEEP CENTER | Facility: HOME HEALTH | Age: 60
End: 2025-04-16

## 2025-04-26 NOTE — ED ADULT TRIAGE NOTE - NS ED NURSE AMBULANCES
Interval History:     Objective:     Vital Signs (Most Recent):  Temp: 98.4 °F (36.9 °C) (04/26/25 0813)  Pulse: 73 (04/26/25 0813)  Resp: 16 (04/26/25 0840)  BP: (!) 145/71 (04/26/25 0813)  SpO2: (!) 94 % (04/26/25 0813) Vital Signs (24h Range):  Temp:  [97.5 °F (36.4 °C)-98.5 °F (36.9 °C)] 98.4 °F (36.9 °C)  Pulse:  [] 73  Resp:  [16-18] 16  SpO2:  [92 %-98 %] 94 %  BP: (130-150)/(66-73) 145/71     Weight: 120 kg (264 lb 8.8 oz)  Body mass index is 33.07 kg/m².    Intake/Output Summary (Last 24 hours) at 4/26/2025 0918  Last data filed at 4/26/2025 0507  Gross per 24 hour   Intake 264 ml   Output 1815 ml   Net -1551 ml         Physical Exam  Vitals and nursing note reviewed.   Constitutional:       General: He is not in acute distress.     Appearance: He is well-developed. He is not ill-appearing.   HENT:      Head: Normocephalic.   Cardiovascular:      Rate and Rhythm: Normal rate and regular rhythm.   Pulmonary:      Effort: Pulmonary effort is normal. No respiratory distress.      Breath sounds: No rales.   Abdominal:      General: There is no distension.   Musculoskeletal:      Comments: RLE dressed with wound vac in place   Neurological:      Mental Status: He is alert and oriented to person, place, and time.             Significant Labs: All pertinent labs within the past 24 hours have been reviewed.     [facility]